# Patient Record
Sex: MALE | Race: WHITE | NOT HISPANIC OR LATINO | Employment: OTHER | URBAN - METROPOLITAN AREA
[De-identification: names, ages, dates, MRNs, and addresses within clinical notes are randomized per-mention and may not be internally consistent; named-entity substitution may affect disease eponyms.]

---

## 2017-02-09 ENCOUNTER — APPOINTMENT (INPATIENT)
Dept: RADIOLOGY | Facility: HOSPITAL | Age: 67
DRG: 301 | End: 2017-02-09
Payer: COMMERCIAL

## 2017-02-09 ENCOUNTER — APPOINTMENT (INPATIENT)
Dept: NON INVASIVE DIAGNOSTICS | Facility: HOSPITAL | Age: 67
DRG: 301 | End: 2017-02-09
Payer: COMMERCIAL

## 2017-02-09 ENCOUNTER — HOSPITAL ENCOUNTER (INPATIENT)
Facility: HOSPITAL | Age: 67
LOS: 1 days | DRG: 301 | End: 2017-02-10
Attending: EMERGENCY MEDICINE | Admitting: INTERNAL MEDICINE
Payer: COMMERCIAL

## 2017-02-09 DIAGNOSIS — I82.422 ACUTE DEEP VEIN THROMBOSIS (DVT) OF ILIAC VEIN OF LEFT LOWER EXTREMITY (HCC): ICD-10-CM

## 2017-02-09 DIAGNOSIS — M79.89 LEFT LEG SWELLING: Primary | ICD-10-CM

## 2017-02-09 DIAGNOSIS — M79.605 LEFT LEG PAIN: ICD-10-CM

## 2017-02-09 PROBLEM — L03.90 CELLULITIS: Status: ACTIVE | Noted: 2017-02-09

## 2017-02-09 LAB
ANION GAP SERPL CALCULATED.3IONS-SCNC: 13 MMOL/L (ref 4–13)
APTT PPP: 32 SECONDS (ref 24–36)
BASOPHILS # BLD AUTO: 0.02 THOUSANDS/ΜL (ref 0–0.1)
BASOPHILS NFR BLD AUTO: 0 % (ref 0–1)
BUN SERPL-MCNC: 18 MG/DL (ref 5–25)
CALCIUM SERPL-MCNC: 9.3 MG/DL (ref 8.3–10.1)
CHLORIDE SERPL-SCNC: 105 MMOL/L (ref 100–108)
CO2 SERPL-SCNC: 26 MMOL/L (ref 21–32)
CREAT SERPL-MCNC: 1.05 MG/DL (ref 0.6–1.3)
DEPRECATED D DIMER PPP: 6079 NG/ML (FEU) (ref 0–424)
EOSINOPHIL # BLD AUTO: 0.26 THOUSAND/ΜL (ref 0–0.61)
EOSINOPHIL NFR BLD AUTO: 4 % (ref 0–6)
ERYTHROCYTE [DISTWIDTH] IN BLOOD BY AUTOMATED COUNT: 13.9 % (ref 11.6–15.1)
GFR SERPL CREATININE-BSD FRML MDRD: >60 ML/MIN/1.73SQ M
GLUCOSE SERPL-MCNC: 100 MG/DL (ref 65–140)
HCT VFR BLD AUTO: 43.1 % (ref 36.5–49.3)
HGB BLD-MCNC: 14.5 G/DL (ref 12–17)
INR PPP: 0.99 (ref 0.86–1.16)
LYMPHOCYTES # BLD AUTO: 1.87 THOUSANDS/ΜL (ref 0.6–4.47)
LYMPHOCYTES NFR BLD AUTO: 26 % (ref 14–44)
MCH RBC QN AUTO: 31.5 PG (ref 26.8–34.3)
MCHC RBC AUTO-ENTMCNC: 33.6 G/DL (ref 31.4–37.4)
MCV RBC AUTO: 94 FL (ref 82–98)
MONOCYTES # BLD AUTO: 0.77 THOUSAND/ΜL (ref 0.17–1.22)
MONOCYTES NFR BLD AUTO: 11 % (ref 4–12)
NEUTROPHILS # BLD AUTO: 4.17 THOUSANDS/ΜL (ref 1.85–7.62)
NEUTS SEG NFR BLD AUTO: 59 % (ref 43–75)
NRBC BLD AUTO-RTO: 0 /100 WBCS
PLATELET # BLD AUTO: 194 THOUSANDS/UL (ref 149–390)
PMV BLD AUTO: 10.4 FL (ref 8.9–12.7)
POTASSIUM SERPL-SCNC: 4.1 MMOL/L (ref 3.5–5.3)
PROTHROMBIN TIME: 13.1 SECONDS (ref 12–14.3)
RBC # BLD AUTO: 4.61 MILLION/UL (ref 3.88–5.62)
SODIUM SERPL-SCNC: 144 MMOL/L (ref 136–145)
WBC # BLD AUTO: 7.09 THOUSAND/UL (ref 4.31–10.16)

## 2017-02-09 PROCEDURE — 85025 COMPLETE CBC W/AUTO DIFF WBC: CPT | Performed by: EMERGENCY MEDICINE

## 2017-02-09 PROCEDURE — 99285 EMERGENCY DEPT VISIT HI MDM: CPT

## 2017-02-09 PROCEDURE — 85379 FIBRIN DEGRADATION QUANT: CPT | Performed by: EMERGENCY MEDICINE

## 2017-02-09 PROCEDURE — 85610 PROTHROMBIN TIME: CPT | Performed by: INTERNAL MEDICINE

## 2017-02-09 PROCEDURE — 36415 COLL VENOUS BLD VENIPUNCTURE: CPT | Performed by: EMERGENCY MEDICINE

## 2017-02-09 PROCEDURE — 80048 BASIC METABOLIC PNL TOTAL CA: CPT | Performed by: EMERGENCY MEDICINE

## 2017-02-09 PROCEDURE — 73502 X-RAY EXAM HIP UNI 2-3 VIEWS: CPT

## 2017-02-09 PROCEDURE — 93970 EXTREMITY STUDY: CPT

## 2017-02-09 PROCEDURE — 85730 THROMBOPLASTIN TIME PARTIAL: CPT | Performed by: INTERNAL MEDICINE

## 2017-02-09 RX ORDER — ACETAMINOPHEN 325 MG/1
650 TABLET ORAL EVERY 6 HOURS PRN
Status: DISCONTINUED | OUTPATIENT
Start: 2017-02-09 | End: 2017-02-10 | Stop reason: HOSPADM

## 2017-02-09 RX ORDER — ASPIRIN 81 MG/1
81 TABLET, CHEWABLE ORAL DAILY
Status: DISCONTINUED | OUTPATIENT
Start: 2017-02-09 | End: 2017-02-10 | Stop reason: HOSPADM

## 2017-02-09 RX ORDER — ESCITALOPRAM OXALATE 10 MG/1
10 TABLET ORAL DAILY
Status: DISCONTINUED | OUTPATIENT
Start: 2017-02-09 | End: 2017-02-10 | Stop reason: HOSPADM

## 2017-02-09 RX ORDER — LORATADINE 10 MG/1
10 TABLET ORAL DAILY
Status: DISCONTINUED | OUTPATIENT
Start: 2017-02-09 | End: 2017-02-10 | Stop reason: HOSPADM

## 2017-02-09 RX ORDER — HYDRALAZINE HYDROCHLORIDE 20 MG/ML
10 INJECTION INTRAMUSCULAR; INTRAVENOUS EVERY 4 HOURS PRN
Status: DISCONTINUED | OUTPATIENT
Start: 2017-02-09 | End: 2017-02-10 | Stop reason: HOSPADM

## 2017-02-09 RX ORDER — ALPRAZOLAM 0.25 MG/1
0.25 TABLET ORAL 3 TIMES DAILY PRN
Status: DISCONTINUED | OUTPATIENT
Start: 2017-02-09 | End: 2017-02-10 | Stop reason: HOSPADM

## 2017-02-09 RX ADMIN — ACETAMINOPHEN 650 MG: 325 TABLET, FILM COATED ORAL at 21:09

## 2017-02-09 RX ADMIN — CEFAZOLIN SODIUM 1000 MG: 1 SOLUTION INTRAVENOUS at 04:54

## 2017-02-09 RX ADMIN — ALPRAZOLAM 0.25 MG: 0.25 TABLET ORAL at 09:12

## 2017-02-09 RX ADMIN — CEFAZOLIN SODIUM 2000 MG: 2 SOLUTION INTRAVENOUS at 12:32

## 2017-02-09 RX ADMIN — LORATADINE 10 MG: 10 TABLET ORAL at 17:58

## 2017-02-09 RX ADMIN — HYDRALAZINE HYDROCHLORIDE 10 MG: 20 INJECTION INTRAMUSCULAR; INTRAVENOUS at 17:03

## 2017-02-09 RX ADMIN — HYDRALAZINE HYDROCHLORIDE 10 MG: 20 INJECTION INTRAMUSCULAR; INTRAVENOUS at 05:32

## 2017-02-09 RX ADMIN — ENOXAPARIN SODIUM 80 MG: 80 INJECTION SUBCUTANEOUS at 04:54

## 2017-02-09 RX ADMIN — CEFAZOLIN SODIUM 2000 MG: 2 SOLUTION INTRAVENOUS at 21:09

## 2017-02-09 RX ADMIN — ESCITALOPRAM OXALATE 10 MG: 10 TABLET ORAL at 09:12

## 2017-02-09 RX ADMIN — ASPIRIN 81 MG 81 MG: 81 TABLET ORAL at 09:12

## 2017-02-09 RX ADMIN — ENOXAPARIN SODIUM 90 MG: 100 INJECTION SUBCUTANEOUS at 17:03

## 2017-02-10 ENCOUNTER — APPOINTMENT (INPATIENT)
Dept: RADIOLOGY | Facility: HOSPITAL | Age: 67
DRG: 272 | End: 2017-02-10
Payer: COMMERCIAL

## 2017-02-10 ENCOUNTER — APPOINTMENT (INPATIENT)
Dept: CT IMAGING | Facility: HOSPITAL | Age: 67
DRG: 301 | End: 2017-02-10
Payer: COMMERCIAL

## 2017-02-10 ENCOUNTER — HOSPITAL ENCOUNTER (INPATIENT)
Facility: HOSPITAL | Age: 67
LOS: 2 days | Discharge: HOME | DRG: 272 | End: 2017-02-12
Attending: SURGERY | Admitting: SURGERY
Payer: COMMERCIAL

## 2017-02-10 VITALS
DIASTOLIC BLOOD PRESSURE: 97 MMHG | OXYGEN SATURATION: 96 % | BODY MASS INDEX: 30.53 KG/M2 | SYSTOLIC BLOOD PRESSURE: 155 MMHG | TEMPERATURE: 98.1 F | HEIGHT: 69 IN | WEIGHT: 206.13 LBS | RESPIRATION RATE: 18 BRPM | HEART RATE: 61 BPM

## 2017-02-10 DIAGNOSIS — I82.422 ACUTE DEEP VEIN THROMBOSIS (DVT) OF ILIAC VEIN OF LEFT LOWER EXTREMITY (HCC): Primary | ICD-10-CM

## 2017-02-10 PROBLEM — IMO0001 WHITE COAT HYPERTENSION: Status: ACTIVE | Noted: 2017-02-10

## 2017-02-10 PROBLEM — L03.90 CELLULITIS: Status: RESOLVED | Noted: 2017-02-09 | Resolved: 2017-02-10

## 2017-02-10 LAB
APTT PPP: 76 SECONDS (ref 24–36)
ERYTHROCYTE [DISTWIDTH] IN BLOOD BY AUTOMATED COUNT: 13.8 % (ref 11.6–15.1)
FIBRINOGEN PPP-MCNC: 334 MG/DL (ref 227–495)
FIBRINOGEN PPP-MCNC: 359 MG/DL (ref 227–495)
HCT VFR BLD AUTO: 39 % (ref 36.5–49.3)
HGB BLD-MCNC: 13.1 G/DL (ref 12–17)
MCH RBC QN AUTO: 31.2 PG (ref 26.8–34.3)
MCHC RBC AUTO-ENTMCNC: 33.6 G/DL (ref 31.4–37.4)
MCV RBC AUTO: 93 FL (ref 82–98)
PLATELET # BLD AUTO: 205 THOUSANDS/UL (ref 149–390)
PMV BLD AUTO: 10.4 FL (ref 8.9–12.7)
RBC # BLD AUTO: 4.2 MILLION/UL (ref 3.88–5.62)
WBC # BLD AUTO: 7.32 THOUSAND/UL (ref 4.31–10.16)

## 2017-02-10 PROCEDURE — 36012 PLACE CATHETER IN VEIN: CPT

## 2017-02-10 PROCEDURE — 75825 VEIN X-RAY TRUNK: CPT

## 2017-02-10 PROCEDURE — C1894 INTRO/SHEATH, NON-LASER: HCPCS

## 2017-02-10 PROCEDURE — 04CJ3ZZ EXTIRPATION OF MATTER FROM LEFT EXTERNAL ILIAC ARTERY, PERCUTANEOUS APPROACH: ICD-10-PCS | Performed by: RADIOLOGY

## 2017-02-10 PROCEDURE — 37187 VENOUS MECH THROMBECTOMY: CPT

## 2017-02-10 PROCEDURE — 04CD3ZZ EXTIRPATION OF MATTER FROM LEFT COMMON ILIAC ARTERY, PERCUTANEOUS APPROACH: ICD-10-PCS | Performed by: RADIOLOGY

## 2017-02-10 PROCEDURE — 3E05317 INTRODUCTION OF OTHER THROMBOLYTIC INTO PERIPHERAL ARTERY, PERCUTANEOUS APPROACH: ICD-10-PCS | Performed by: RADIOLOGY

## 2017-02-10 PROCEDURE — B5191ZZ FLUOROSCOPY OF INFERIOR VENA CAVA USING LOW OSMOLAR CONTRAST: ICD-10-PCS | Performed by: RADIOLOGY

## 2017-02-10 PROCEDURE — C1769 GUIDE WIRE: HCPCS

## 2017-02-10 PROCEDURE — 85384 FIBRINOGEN ACTIVITY: CPT | Performed by: SURGERY

## 2017-02-10 PROCEDURE — C1725 CATH, TRANSLUMIN NON-LASER: HCPCS

## 2017-02-10 PROCEDURE — 047D3ZZ DILATION OF LEFT COMMON ILIAC ARTERY, PERCUTANEOUS APPROACH: ICD-10-PCS | Performed by: RADIOLOGY

## 2017-02-10 PROCEDURE — 70450 CT HEAD/BRAIN W/O DYE: CPT

## 2017-02-10 PROCEDURE — 37248 TRLUML BALO ANGIOP 1ST VEIN: CPT

## 2017-02-10 PROCEDURE — 36005 INJECTION EXT VENOGRAPHY: CPT

## 2017-02-10 PROCEDURE — 75820 VEIN X-RAY ARM/LEG: CPT

## 2017-02-10 PROCEDURE — C1751 CATH, INF, PER/CENT/MIDLINE: HCPCS

## 2017-02-10 PROCEDURE — 76937 US GUIDE VASCULAR ACCESS: CPT

## 2017-02-10 PROCEDURE — C1757 CATH, THROMBECTOMY/EMBOLECT: HCPCS

## 2017-02-10 PROCEDURE — 85730 THROMBOPLASTIN TIME PARTIAL: CPT | Performed by: RADIOLOGY

## 2017-02-10 PROCEDURE — 85384 FIBRINOGEN ACTIVITY: CPT | Performed by: RADIOLOGY

## 2017-02-10 PROCEDURE — 37212 THROMBOLYTIC VENOUS THERAPY: CPT

## 2017-02-10 PROCEDURE — 85027 COMPLETE CBC AUTOMATED: CPT | Performed by: RADIOLOGY

## 2017-02-10 RX ORDER — ALPRAZOLAM 0.25 MG/1
0.25 TABLET ORAL 3 TIMES DAILY PRN
Status: CANCELLED | OUTPATIENT
Start: 2017-02-10

## 2017-02-10 RX ORDER — ASPIRIN 81 MG/1
81 TABLET, CHEWABLE ORAL DAILY
Status: DISCONTINUED | OUTPATIENT
Start: 2017-02-11 | End: 2017-02-12 | Stop reason: HOSPADM

## 2017-02-10 RX ORDER — ACETAMINOPHEN 325 MG/1
650 TABLET ORAL EVERY 6 HOURS PRN
Status: CANCELLED | OUTPATIENT
Start: 2017-02-10

## 2017-02-10 RX ORDER — LORATADINE 10 MG/1
10 TABLET ORAL DAILY
Status: CANCELLED | OUTPATIENT
Start: 2017-02-11

## 2017-02-10 RX ORDER — HEPARIN SODIUM 1000 [USP'U]/ML
INJECTION, SOLUTION INTRAVENOUS; SUBCUTANEOUS CODE/TRAUMA/SEDATION MEDICATION
Status: COMPLETED | OUTPATIENT
Start: 2017-02-10 | End: 2017-02-10

## 2017-02-10 RX ORDER — ASPIRIN 81 MG/1
81 TABLET, CHEWABLE ORAL DAILY
Status: CANCELLED | OUTPATIENT
Start: 2017-02-11

## 2017-02-10 RX ORDER — FENTANYL CITRATE 50 UG/ML
INJECTION, SOLUTION INTRAMUSCULAR; INTRAVENOUS CODE/TRAUMA/SEDATION MEDICATION
Status: COMPLETED | OUTPATIENT
Start: 2017-02-10 | End: 2017-02-10

## 2017-02-10 RX ORDER — HYDRALAZINE HYDROCHLORIDE 20 MG/ML
5 INJECTION INTRAMUSCULAR; INTRAVENOUS EVERY 6 HOURS PRN
Status: DISCONTINUED | OUTPATIENT
Start: 2017-02-10 | End: 2017-02-12 | Stop reason: HOSPADM

## 2017-02-10 RX ORDER — ALPRAZOLAM 0.25 MG/1
0.25 TABLET ORAL 3 TIMES DAILY PRN
Status: DISCONTINUED | OUTPATIENT
Start: 2017-02-10 | End: 2017-02-12 | Stop reason: HOSPADM

## 2017-02-10 RX ORDER — HEPARIN SODIUM 10000 [USP'U]/100ML
500 INJECTION, SOLUTION INTRAVENOUS CONTINUOUS
Status: DISCONTINUED | OUTPATIENT
Start: 2017-02-10 | End: 2017-02-11

## 2017-02-10 RX ORDER — MIDAZOLAM HYDROCHLORIDE 1 MG/ML
INJECTION INTRAMUSCULAR; INTRAVENOUS CODE/TRAUMA/SEDATION MEDICATION
Status: COMPLETED | OUTPATIENT
Start: 2017-02-10 | End: 2017-02-10

## 2017-02-10 RX ORDER — ESCITALOPRAM OXALATE 10 MG/1
10 TABLET ORAL DAILY
Status: DISCONTINUED | OUTPATIENT
Start: 2017-02-11 | End: 2017-02-12 | Stop reason: HOSPADM

## 2017-02-10 RX ORDER — SODIUM CHLORIDE 9 MG/ML
84 INJECTION, SOLUTION INTRAVENOUS CONTINUOUS
Status: DISCONTINUED | OUTPATIENT
Start: 2017-02-11 | End: 2017-02-12

## 2017-02-10 RX ORDER — ESCITALOPRAM OXALATE 10 MG/1
10 TABLET ORAL DAILY
Status: CANCELLED | OUTPATIENT
Start: 2017-02-11

## 2017-02-10 RX ADMIN — ACETAMINOPHEN 650 MG: 325 TABLET, FILM COATED ORAL at 11:27

## 2017-02-10 RX ADMIN — ESCITALOPRAM OXALATE 10 MG: 10 TABLET ORAL at 09:21

## 2017-02-10 RX ADMIN — ALTEPLASE 1 MG/HR: 2.2 INJECTION, POWDER, LYOPHILIZED, FOR SOLUTION INTRAVENOUS at 20:21

## 2017-02-10 RX ADMIN — MIDAZOLAM HYDROCHLORIDE 1 MG: 1 INJECTION, SOLUTION INTRAMUSCULAR; INTRAVENOUS at 19:44

## 2017-02-10 RX ADMIN — HEPARIN SODIUM 20 UNITS/HR: 200 INJECTION, SOLUTION INTRAVENOUS at 20:21

## 2017-02-10 RX ADMIN — HEPARIN SODIUM 500 UNITS/HR: 10000 INJECTION, SOLUTION INTRAVENOUS at 21:46

## 2017-02-10 RX ADMIN — HEPARIN SODIUM 5000 UNITS: 1000 INJECTION INTRAVENOUS; SUBCUTANEOUS at 19:35

## 2017-02-10 RX ADMIN — MIDAZOLAM HYDROCHLORIDE 1 MG: 1 INJECTION, SOLUTION INTRAMUSCULAR; INTRAVENOUS at 18:09

## 2017-02-10 RX ADMIN — HYDRALAZINE HYDROCHLORIDE 5 MG: 20 INJECTION INTRAMUSCULAR; INTRAVENOUS at 21:46

## 2017-02-10 RX ADMIN — CEFAZOLIN SODIUM 2000 MG: 2 SOLUTION INTRAVENOUS at 06:15

## 2017-02-10 RX ADMIN — IOHEXOL 28 ML: 300 INJECTION, SOLUTION INTRAVENOUS at 20:37

## 2017-02-10 RX ADMIN — FENTANYL CITRATE 50 MCG: 50 INJECTION, SOLUTION INTRAMUSCULAR; INTRAVENOUS at 18:06

## 2017-02-10 RX ADMIN — ASPIRIN 81 MG 81 MG: 81 TABLET ORAL at 09:21

## 2017-02-10 RX ADMIN — MIDAZOLAM HYDROCHLORIDE 1 MG: 1 INJECTION, SOLUTION INTRAMUSCULAR; INTRAVENOUS at 18:36

## 2017-02-10 RX ADMIN — FENTANYL CITRATE 50 MCG: 50 INJECTION, SOLUTION INTRAMUSCULAR; INTRAVENOUS at 18:28

## 2017-02-10 RX ADMIN — MIDAZOLAM HYDROCHLORIDE 1 MG: 1 INJECTION, SOLUTION INTRAMUSCULAR; INTRAVENOUS at 19:10

## 2017-02-10 RX ADMIN — LORATADINE 10 MG: 10 TABLET ORAL at 09:21

## 2017-02-10 RX ADMIN — MIDAZOLAM HYDROCHLORIDE 1 MG: 1 INJECTION, SOLUTION INTRAMUSCULAR; INTRAVENOUS at 18:06

## 2017-02-10 RX ADMIN — MIDAZOLAM HYDROCHLORIDE 1 MG: 1 INJECTION, SOLUTION INTRAMUSCULAR; INTRAVENOUS at 18:16

## 2017-02-10 RX ADMIN — SODIUM CHLORIDE 84 ML/HR: 0.9 INJECTION, SOLUTION INTRAVENOUS at 23:43

## 2017-02-10 RX ADMIN — FENTANYL CITRATE 50 MCG: 50 INJECTION, SOLUTION INTRAMUSCULAR; INTRAVENOUS at 18:09

## 2017-02-10 RX ADMIN — ALPRAZOLAM 0.25 MG: 0.25 TABLET ORAL at 09:27

## 2017-02-10 RX ADMIN — ENOXAPARIN SODIUM 90 MG: 100 INJECTION SUBCUTANEOUS at 09:21

## 2017-02-10 RX ADMIN — FENTANYL CITRATE 50 MCG: 50 INJECTION, SOLUTION INTRAMUSCULAR; INTRAVENOUS at 19:15

## 2017-02-11 ENCOUNTER — APPOINTMENT (INPATIENT)
Dept: RADIOLOGY | Facility: HOSPITAL | Age: 67
DRG: 272 | End: 2017-02-11
Payer: COMMERCIAL

## 2017-02-11 LAB
ANION GAP SERPL CALCULATED.3IONS-SCNC: 11 MMOL/L (ref 4–13)
APTT PPP: 33 SECONDS (ref 24–36)
APTT PPP: 38 SECONDS (ref 24–36)
APTT PPP: 89 SECONDS (ref 24–36)
BUN SERPL-MCNC: 13 MG/DL (ref 5–25)
CALCIUM SERPL-MCNC: 8.4 MG/DL (ref 8.3–10.1)
CHLORIDE SERPL-SCNC: 108 MMOL/L (ref 100–108)
CO2 SERPL-SCNC: 24 MMOL/L (ref 21–32)
CREAT SERPL-MCNC: 0.82 MG/DL (ref 0.6–1.3)
ERYTHROCYTE [DISTWIDTH] IN BLOOD BY AUTOMATED COUNT: 13.7 % (ref 11.6–15.1)
ERYTHROCYTE [DISTWIDTH] IN BLOOD BY AUTOMATED COUNT: 13.9 % (ref 11.6–15.1)
FIBRINOGEN PPP-MCNC: 189 MG/DL (ref 227–495)
FIBRINOGEN PPP-MCNC: 321 MG/DL (ref 227–495)
GFR SERPL CREATININE-BSD FRML MDRD: >60 ML/MIN/1.73SQ M
GLUCOSE SERPL-MCNC: 104 MG/DL (ref 65–140)
HCT VFR BLD AUTO: 40.4 % (ref 36.5–49.3)
HCT VFR BLD AUTO: 40.8 % (ref 36.5–49.3)
HGB BLD-MCNC: 13.3 G/DL (ref 12–17)
HGB BLD-MCNC: 13.5 G/DL (ref 12–17)
INR PPP: 1.1 (ref 0.86–1.16)
MCH RBC QN AUTO: 30.5 PG (ref 26.8–34.3)
MCH RBC QN AUTO: 30.6 PG (ref 26.8–34.3)
MCHC RBC AUTO-ENTMCNC: 32.9 G/DL (ref 31.4–37.4)
MCHC RBC AUTO-ENTMCNC: 33.1 G/DL (ref 31.4–37.4)
MCV RBC AUTO: 93 FL (ref 82–98)
MCV RBC AUTO: 93 FL (ref 82–98)
PLATELET # BLD AUTO: 188 THOUSANDS/UL (ref 149–390)
PLATELET # BLD AUTO: 201 THOUSANDS/UL (ref 149–390)
PMV BLD AUTO: 10 FL (ref 8.9–12.7)
PMV BLD AUTO: 9.9 FL (ref 8.9–12.7)
POTASSIUM SERPL-SCNC: 3.4 MMOL/L (ref 3.5–5.3)
PROTHROMBIN TIME: 14.3 SECONDS (ref 12–14.3)
RBC # BLD AUTO: 4.36 MILLION/UL (ref 3.88–5.62)
RBC # BLD AUTO: 4.41 MILLION/UL (ref 3.88–5.62)
SODIUM SERPL-SCNC: 143 MMOL/L (ref 136–145)
WBC # BLD AUTO: 7.05 THOUSAND/UL (ref 4.31–10.16)
WBC # BLD AUTO: 8.4 THOUSAND/UL (ref 4.31–10.16)

## 2017-02-11 PROCEDURE — C1769 GUIDE WIRE: HCPCS

## 2017-02-11 PROCEDURE — 85730 THROMBOPLASTIN TIME PARTIAL: CPT | Performed by: RADIOLOGY

## 2017-02-11 PROCEDURE — C1876 STENT, NON-COA/NON-COV W/DEL: HCPCS

## 2017-02-11 PROCEDURE — 85384 FIBRINOGEN ACTIVITY: CPT | Performed by: RADIOLOGY

## 2017-02-11 PROCEDURE — C1757 CATH, THROMBECTOMY/EMBOLECT: HCPCS

## 2017-02-11 PROCEDURE — 37187 VENOUS MECH THROMBECTOMY: CPT

## 2017-02-11 PROCEDURE — C1725 CATH, TRANSLUMIN NON-LASER: HCPCS

## 2017-02-11 PROCEDURE — 80048 BASIC METABOLIC PNL TOTAL CA: CPT | Performed by: SURGERY

## 2017-02-11 PROCEDURE — 37238 OPEN/PERQ PLACE STENT SAME: CPT

## 2017-02-11 PROCEDURE — 37214 CESSJ THERAPY CATH REMOVAL: CPT

## 2017-02-11 PROCEDURE — 047J3D6: ICD-10-PCS | Performed by: RADIOLOGY

## 2017-02-11 PROCEDURE — 85027 COMPLETE CBC AUTOMATED: CPT | Performed by: RADIOLOGY

## 2017-02-11 PROCEDURE — C1894 INTRO/SHEATH, NON-LASER: HCPCS

## 2017-02-11 PROCEDURE — 85730 THROMBOPLASTIN TIME PARTIAL: CPT | Performed by: EMERGENCY MEDICINE

## 2017-02-11 PROCEDURE — 04CJ3ZZ EXTIRPATION OF MATTER FROM LEFT EXTERNAL ILIAC ARTERY, PERCUTANEOUS APPROACH: ICD-10-PCS | Performed by: RADIOLOGY

## 2017-02-11 PROCEDURE — 85610 PROTHROMBIN TIME: CPT | Performed by: EMERGENCY MEDICINE

## 2017-02-11 RX ORDER — OXYCODONE HYDROCHLORIDE 5 MG/1
5 TABLET ORAL EVERY 4 HOURS PRN
Status: DISCONTINUED | OUTPATIENT
Start: 2017-02-11 | End: 2017-02-12 | Stop reason: HOSPADM

## 2017-02-11 RX ORDER — POTASSIUM CHLORIDE 14.9 MG/ML
20 INJECTION INTRAVENOUS
Status: COMPLETED | OUTPATIENT
Start: 2017-02-11 | End: 2017-02-11

## 2017-02-11 RX ORDER — FENTANYL CITRATE 50 UG/ML
INJECTION, SOLUTION INTRAMUSCULAR; INTRAVENOUS CODE/TRAUMA/SEDATION MEDICATION
Status: COMPLETED | OUTPATIENT
Start: 2017-02-11 | End: 2017-02-11

## 2017-02-11 RX ORDER — HEPARIN SODIUM 1000 [USP'U]/ML
7200 INJECTION, SOLUTION INTRAVENOUS; SUBCUTANEOUS AS NEEDED
Status: DISCONTINUED | OUTPATIENT
Start: 2017-02-11 | End: 2017-02-11

## 2017-02-11 RX ORDER — HEPARIN SODIUM 1000 [USP'U]/ML
7200 INJECTION, SOLUTION INTRAVENOUS; SUBCUTANEOUS ONCE
Status: DISCONTINUED | OUTPATIENT
Start: 2017-02-11 | End: 2017-02-11

## 2017-02-11 RX ORDER — HEPARIN SODIUM 1000 [USP'U]/ML
3600 INJECTION, SOLUTION INTRAVENOUS; SUBCUTANEOUS AS NEEDED
Status: DISCONTINUED | OUTPATIENT
Start: 2017-02-11 | End: 2017-02-11

## 2017-02-11 RX ORDER — HEPARIN SODIUM 1000 [USP'U]/ML
3600 INJECTION, SOLUTION INTRAVENOUS; SUBCUTANEOUS AS NEEDED
Status: DISCONTINUED | OUTPATIENT
Start: 2017-02-11 | End: 2017-02-12

## 2017-02-11 RX ORDER — HEPARIN SODIUM 10000 [USP'U]/100ML
3-30 INJECTION, SOLUTION INTRAVENOUS
Status: DISCONTINUED | OUTPATIENT
Start: 2017-02-11 | End: 2017-02-12

## 2017-02-11 RX ORDER — ACETAMINOPHEN 325 MG/1
650 TABLET ORAL EVERY 6 HOURS PRN
Status: DISCONTINUED | OUTPATIENT
Start: 2017-02-11 | End: 2017-02-12 | Stop reason: HOSPADM

## 2017-02-11 RX ORDER — HEPARIN SODIUM 1000 [USP'U]/ML
INJECTION, SOLUTION INTRAVENOUS; SUBCUTANEOUS CODE/TRAUMA/SEDATION MEDICATION
Status: COMPLETED | OUTPATIENT
Start: 2017-02-11 | End: 2017-02-11

## 2017-02-11 RX ORDER — HEPARIN SODIUM 1000 [USP'U]/ML
7200 INJECTION, SOLUTION INTRAVENOUS; SUBCUTANEOUS AS NEEDED
Status: DISCONTINUED | OUTPATIENT
Start: 2017-02-11 | End: 2017-02-12

## 2017-02-11 RX ORDER — HEPARIN SODIUM 10000 [USP'U]/100ML
3-30 INJECTION, SOLUTION INTRAVENOUS
Status: DISCONTINUED | OUTPATIENT
Start: 2017-02-11 | End: 2017-02-11

## 2017-02-11 RX ADMIN — ACETAMINOPHEN 650 MG: 325 TABLET, FILM COATED ORAL at 09:37

## 2017-02-11 RX ADMIN — OXYCODONE HYDROCHLORIDE 5 MG: 5 TABLET ORAL at 22:06

## 2017-02-11 RX ADMIN — POTASSIUM CHLORIDE 20 MEQ: 200 INJECTION, SOLUTION INTRAVENOUS at 09:19

## 2017-02-11 RX ADMIN — POTASSIUM CHLORIDE 20 MEQ: 200 INJECTION, SOLUTION INTRAVENOUS at 11:03

## 2017-02-11 RX ADMIN — ALTEPLASE 1 MG/HR: 2.2 INJECTION, POWDER, LYOPHILIZED, FOR SOLUTION INTRAVENOUS at 07:21

## 2017-02-11 RX ADMIN — ASPIRIN 81 MG 81 MG: 81 TABLET ORAL at 09:19

## 2017-02-11 RX ADMIN — SODIUM CHLORIDE 84 ML/HR: 0.9 INJECTION, SOLUTION INTRAVENOUS at 21:26

## 2017-02-11 RX ADMIN — ESCITALOPRAM OXALATE 10 MG: 10 TABLET ORAL at 09:19

## 2017-02-11 RX ADMIN — SODIUM CHLORIDE 84 ML/HR: 0.9 INJECTION, SOLUTION INTRAVENOUS at 07:42

## 2017-02-11 RX ADMIN — FENTANYL CITRATE 50 MCG: 50 INJECTION, SOLUTION INTRAMUSCULAR; INTRAVENOUS at 15:58

## 2017-02-11 RX ADMIN — POTASSIUM CHLORIDE 20 MEQ: 200 INJECTION, SOLUTION INTRAVENOUS at 19:46

## 2017-02-11 RX ADMIN — ALPRAZOLAM 0.25 MG: 0.25 TABLET ORAL at 01:34

## 2017-02-11 RX ADMIN — HEPARIN SODIUM AND DEXTROSE 18 UNITS/KG/HR: 10000; 5 INJECTION INTRAVENOUS at 17:07

## 2017-02-11 RX ADMIN — HEPARIN SODIUM AND DEXTROSE 18 UNITS/KG/HR: 10000; 5 INJECTION INTRAVENOUS at 22:07

## 2017-02-11 RX ADMIN — ALPRAZOLAM 0.25 MG: 0.25 TABLET ORAL at 09:19

## 2017-02-11 RX ADMIN — IOHEXOL 28 ML: 300 INJECTION, SOLUTION INTRAVENOUS at 17:26

## 2017-02-11 RX ADMIN — HYDRALAZINE HYDROCHLORIDE 5 MG: 20 INJECTION INTRAMUSCULAR; INTRAVENOUS at 03:43

## 2017-02-11 RX ADMIN — POTASSIUM CHLORIDE 20 MEQ: 200 INJECTION, SOLUTION INTRAVENOUS at 17:26

## 2017-02-11 RX ADMIN — HEPARIN SODIUM 4000 UNITS: 1000 INJECTION INTRAVENOUS; SUBCUTANEOUS at 16:15

## 2017-02-11 RX ADMIN — OXYCODONE HYDROCHLORIDE 5 MG: 5 TABLET ORAL at 07:29

## 2017-02-11 RX ADMIN — ACETAMINOPHEN 650 MG: 325 TABLET, FILM COATED ORAL at 17:26

## 2017-02-12 VITALS
DIASTOLIC BLOOD PRESSURE: 83 MMHG | HEART RATE: 68 BPM | OXYGEN SATURATION: 98 % | WEIGHT: 201.5 LBS | SYSTOLIC BLOOD PRESSURE: 142 MMHG | RESPIRATION RATE: 30 BRPM | BODY MASS INDEX: 29.84 KG/M2 | HEIGHT: 69 IN | TEMPERATURE: 98.3 F

## 2017-02-12 LAB
ANION GAP SERPL CALCULATED.3IONS-SCNC: 9 MMOL/L (ref 4–13)
APTT PPP: 71 SECONDS (ref 24–36)
APTT PPP: 97 SECONDS (ref 24–36)
BUN SERPL-MCNC: 11 MG/DL (ref 5–25)
CALCIUM SERPL-MCNC: 7.9 MG/DL (ref 8.3–10.1)
CHLORIDE SERPL-SCNC: 109 MMOL/L (ref 100–108)
CO2 SERPL-SCNC: 24 MMOL/L (ref 21–32)
CREAT SERPL-MCNC: 0.72 MG/DL (ref 0.6–1.3)
ERYTHROCYTE [DISTWIDTH] IN BLOOD BY AUTOMATED COUNT: 14 % (ref 11.6–15.1)
GFR SERPL CREATININE-BSD FRML MDRD: >60 ML/MIN/1.73SQ M
GLUCOSE SERPL-MCNC: 100 MG/DL (ref 65–140)
HCT VFR BLD AUTO: 35.4 % (ref 36.5–49.3)
HGB BLD-MCNC: 11.7 G/DL (ref 12–17)
MCH RBC QN AUTO: 30.6 PG (ref 26.8–34.3)
MCHC RBC AUTO-ENTMCNC: 33.1 G/DL (ref 31.4–37.4)
MCV RBC AUTO: 93 FL (ref 82–98)
PLATELET # BLD AUTO: 172 THOUSANDS/UL (ref 149–390)
PMV BLD AUTO: 10.4 FL (ref 8.9–12.7)
POTASSIUM SERPL-SCNC: 3.6 MMOL/L (ref 3.5–5.3)
RBC # BLD AUTO: 3.82 MILLION/UL (ref 3.88–5.62)
SODIUM SERPL-SCNC: 142 MMOL/L (ref 136–145)
WBC # BLD AUTO: 6.84 THOUSAND/UL (ref 4.31–10.16)

## 2017-02-12 PROCEDURE — 80048 BASIC METABOLIC PNL TOTAL CA: CPT | Performed by: EMERGENCY MEDICINE

## 2017-02-12 PROCEDURE — 85027 COMPLETE CBC AUTOMATED: CPT | Performed by: EMERGENCY MEDICINE

## 2017-02-12 PROCEDURE — 85730 THROMBOPLASTIN TIME PARTIAL: CPT | Performed by: SURGERY

## 2017-02-12 PROCEDURE — 85730 THROMBOPLASTIN TIME PARTIAL: CPT | Performed by: EMERGENCY MEDICINE

## 2017-02-12 RX ORDER — OXYCODONE HYDROCHLORIDE 5 MG/1
5 TABLET ORAL EVERY 4 HOURS PRN
Qty: 30 TABLET | Refills: 0
Start: 2017-02-12 | End: 2018-01-30 | Stop reason: ALTCHOICE

## 2017-02-12 RX ADMIN — ASPIRIN 81 MG 81 MG: 81 TABLET ORAL at 10:12

## 2017-02-12 RX ADMIN — OXYCODONE HYDROCHLORIDE 5 MG: 5 TABLET ORAL at 15:22

## 2017-02-12 RX ADMIN — RIVAROXABAN 15 MG: 15 TABLET, FILM COATED ORAL at 14:57

## 2017-02-12 RX ADMIN — OXYCODONE HYDROCHLORIDE 5 MG: 5 TABLET ORAL at 10:13

## 2017-02-12 RX ADMIN — ESCITALOPRAM OXALATE 10 MG: 10 TABLET ORAL at 10:12

## 2017-02-14 ENCOUNTER — GENERIC CONVERSION - ENCOUNTER (OUTPATIENT)
Dept: OTHER | Facility: OTHER | Age: 67
End: 2017-02-14

## 2017-02-16 ENCOUNTER — TRANSCRIBE ORDERS (OUTPATIENT)
Dept: ADMINISTRATIVE | Facility: HOSPITAL | Age: 67
End: 2017-02-16

## 2017-02-16 ENCOUNTER — ALLSCRIPTS OFFICE VISIT (OUTPATIENT)
Dept: OTHER | Facility: OTHER | Age: 67
End: 2017-02-16

## 2017-02-16 DIAGNOSIS — I82.422: Primary | ICD-10-CM

## 2017-02-17 ENCOUNTER — GENERIC CONVERSION - ENCOUNTER (OUTPATIENT)
Dept: OTHER | Facility: OTHER | Age: 67
End: 2017-02-17

## 2017-02-22 ENCOUNTER — ALLSCRIPTS OFFICE VISIT (OUTPATIENT)
Dept: OTHER | Facility: OTHER | Age: 67
End: 2017-02-22

## 2017-04-05 ENCOUNTER — ALLSCRIPTS OFFICE VISIT (OUTPATIENT)
Dept: OTHER | Facility: OTHER | Age: 67
End: 2017-04-05

## 2017-04-26 ENCOUNTER — GENERIC CONVERSION - ENCOUNTER (OUTPATIENT)
Dept: OTHER | Facility: OTHER | Age: 67
End: 2017-04-26

## 2017-05-01 ENCOUNTER — GENERIC CONVERSION - ENCOUNTER (OUTPATIENT)
Dept: OTHER | Facility: OTHER | Age: 67
End: 2017-05-01

## 2017-05-08 DIAGNOSIS — R93.5 ABNORMAL FINDINGS ON DIAGNOSTIC IMAGING OF OTHER ABDOMINAL REGIONS, INCLUDING RETROPERITONEUM: ICD-10-CM

## 2017-05-09 ENCOUNTER — HOSPITAL ENCOUNTER (OUTPATIENT)
Dept: CT IMAGING | Facility: HOSPITAL | Age: 67
Discharge: HOME | End: 2017-05-09
Attending: SURGERY
Payer: COMMERCIAL

## 2017-05-09 DIAGNOSIS — E27.9 DISORDER OF ADRENAL GLAND (HCC): ICD-10-CM

## 2017-05-09 PROCEDURE — 74177 CT ABD & PELVIS W/CONTRAST: CPT

## 2017-05-09 RX ADMIN — IOHEXOL 100 ML: 350 INJECTION, SOLUTION INTRAVENOUS at 09:01

## 2017-05-12 ENCOUNTER — GENERIC CONVERSION - ENCOUNTER (OUTPATIENT)
Dept: OTHER | Facility: OTHER | Age: 67
End: 2017-05-12

## 2017-07-16 DIAGNOSIS — I82.422 EMBOLISM AND THROMBOSIS OF LEFT ILIAC VEIN (HCC): ICD-10-CM

## 2017-07-17 ENCOUNTER — HOSPITAL ENCOUNTER (OUTPATIENT)
Dept: NON INVASIVE DIAGNOSTICS | Facility: CLINIC | Age: 67
Discharge: HOME | End: 2017-07-17
Payer: COMMERCIAL

## 2017-07-17 DIAGNOSIS — I82.422 EMBOLISM AND THROMBOSIS OF LEFT ILIAC VEIN (HCC): ICD-10-CM

## 2017-07-17 PROCEDURE — 93978 VASCULAR STUDY: CPT

## 2017-07-17 PROCEDURE — 93970 EXTREMITY STUDY: CPT

## 2017-07-18 ENCOUNTER — ALLSCRIPTS OFFICE VISIT (OUTPATIENT)
Dept: OTHER | Facility: OTHER | Age: 67
End: 2017-07-18

## 2017-07-19 ENCOUNTER — ALLSCRIPTS OFFICE VISIT (OUTPATIENT)
Dept: OTHER | Facility: OTHER | Age: 67
End: 2017-07-19

## 2017-07-24 ENCOUNTER — GENERIC CONVERSION - ENCOUNTER (OUTPATIENT)
Dept: OTHER | Facility: OTHER | Age: 67
End: 2017-07-24

## 2017-08-15 ENCOUNTER — ALLSCRIPTS OFFICE VISIT (OUTPATIENT)
Dept: OTHER | Facility: OTHER | Age: 67
End: 2017-08-15

## 2017-08-17 ENCOUNTER — GENERIC CONVERSION - ENCOUNTER (OUTPATIENT)
Dept: OTHER | Facility: OTHER | Age: 67
End: 2017-08-17

## 2018-01-09 NOTE — MISCELLANEOUS
Message  wife called asking about prophylactic antibiotics for dental work, we recommended (along with arterial stent protocol) 2 gms Keflex one hour prior to dental work, since his stent was so recent  i called wife and told her to tell the dentist to order  Active Problems    1  Abnormal CT of the abdomen (793 6) (R93 5)   2  Acute deep vein thrombosis (DVT) of iliac vein of left lower extremity (453 41) (I82 422)   3  Anxiety (300 00) (F41 9)   4  HTN, white coat (796 2) (R03 0)   5  Hypercholesteremia (272 0) (E78 00)   6  Need for prophylactic vaccination and inoculation against influenza (V04 81) (Z23)   7  Osteoarthritis (715 90) (M19 90)    Current Meds   1  ALPRAZolam 0 25 MG Oral Tablet; TAKE 1 TABLET 3 TIMES DAILY AS NEEDED; Therapy: 01Sep2015 to (Evaluate:79Hbp2211); Last Rx:16Jan2017 Ordered   2  Aspirin 81 MG TABS; TAKE 1 TABLET DAILY; Therapy: (Recorded:95Xiu3837) to Recorded   3  Daily Multivitamin TABS; Therapy: (Recorded:09Feb2015) to Recorded   4  Escitalopram Oxalate 10 MG Oral Tablet; TAKE 1/2  TABLET BY MOUTH EVERY DAY; Therapy: 03Sep2015 to (Evaluate:91Cxh1101)  Requested for: 61ZWA0513; Last   Rx:92The8982 Ordered   5  Glucosamine-Chondroitin DS Oral Tablet; TAKE 1 TABLET DAILY AS DIRECTED; Therapy: (Recorded:72Kyv7497) to Recorded   6  Magnesium 250 MG Oral Tablet; TAKE 1 TABLET DAILY; Therapy: (Recorded:11Piq5048) to Recorded   7  Omega-3 Krill Oil 300 MG Oral Capsule; 1 DAILY; Therapy: (Recorded:06Tpu3011) to Recorded   8  Oxycodone-Acetaminophen 5-325 MG Oral Tablet; TAKE 1 TABLET EVERY 4 TO 6   HOURS AS NEEDED FOR PAIN;   Therapy: 55SCF3402 to (Evaluate:31Onl1331); Last Rx:44Cfs1155 Ordered   9  Saw Palmetto 450 MG Oral Capsule; 2 tabs daily; Therapy: (Recorded:66Ymx6634) to Recorded   10  Vitamin D3 2000 UNIT Oral Capsule; take 1 capsule once daily; Therapy: (Recorded:07Xxy5140) to Recorded   11  Xarelto 20 MG Oral Tablet; Take 1 tablet daily;  Last Rx:83Cfo5894; Status: NEED    INFORMATION - Problem Ordered   12  Zyrtec 10 MG TABS; Take 1 tablet daily; Therapy: (Recorded:14Vzg0829) to Recorded    Allergies    1  No Known Drug Allergies    2  No Known Environmental Allergies   3  No Known Food Allergies    Signatures   Electronically signed by :  Haresh Kincaid, ; Feb 17 2017  9:25AM EST                       (Author)

## 2018-01-10 NOTE — MISCELLANEOUS
Message   Recorded as Task   Date: 05/12/2017 12:21 PM, Created By: Goran Arellano   Task Name: Go to Result   Assigned To: [facility],Team   Regarding Patient: Nanci Ruiz, Status: Active   CommentArmida Covert - 12 May 2017 12:21 PM     TASK CREATED  call patient with benign results   no abnormal adreanal glands  Leanne Perez - 12 May 2017 12:52 PM     TASK EDITED  Called patient and results of CT abd/pelvis were given  Patient was very happy with the results  Active Problems    1  Abnormal CT of the abdomen (793 6) (R93 5)   2  Acute deep vein thrombosis (DVT) of iliac vein of left lower extremity (453 41) (I82 422)   3  Anxiety (300 00) (F41 9)   4  Elevated BP without diagnosis of hypertension (796 2) (R03 0)   5  Hypercholesteremia (272 0) (E78 00)   6  Need for pneumococcal vaccination (V03 82) (Z23)   7  Need for prophylactic vaccination and inoculation against influenza (V04 81) (Z23)   8  Osteoarthritis (715 90) (M19 90)   9  Screening for genitourinary condition (V81 6) (Z13 89)   10  Skin rash (782 1) (R21)    Current Meds   1  ALPRAZolam 0 25 MG Oral Tablet; TAKE 1 TABLET 3 TIMES DAILY AS NEEDED; Therapy: 01Sep2015 to (Evaluate:34Tjp6874); Last Rx:05Apr2017 Ordered   2  Daily Multivitamin TABS; Therapy: (Recorded:08Pyv3892) to Recorded   3  Escitalopram Oxalate 10 MG Oral Tablet; TAKE 1/2  TABLET BY MOUTH EVERY DAY; Therapy: 03Sep2015 to (Evaluate:14Ixl1376)  Requested for: 21QIC4650; Last   Rx:15Jxf2832 Ordered   4  Glucosamine-Chondroitin DS Oral Tablet; TAKE 1 TABLET DAILY AS DIRECTED; Therapy: (Recorded:03Sep2015) to Recorded   5  Magnesium 250 MG Oral Tablet; TAKE 1 TABLET DAILY; Therapy: (Recorded:09Feb2015) to Recorded   6  Mometasone Furoate 0 1 % External Cream; apply daily; Therapy: 69DTP4414 to (Eveline Car)  Requested for: 05Apr2017; Last   Rx:05Apr2017 Ordered   7  Omega-3 Krill Oil 300 MG Oral Capsule; 1 DAILY;    Therapy: (Recorded:54Nle0811) to Recorded   8  Saw Palmetto 450 MG Oral Capsule; 2 tabs daily; Therapy: (Recorded:09Feb2015) to Recorded   9  Vitamin D3 2000 UNIT Oral Capsule; take 1 capsule once daily; Therapy: (Recorded:09Feb2015) to Recorded   10  Xarelto 20 MG Oral Tablet; One tablet daily  Requested for: 05Apr2017; Last    Rx:05Apr2017 Ordered   11  Zyrtec 10 MG TABS; Take 1 tablet daily; Therapy: (Recorded:09Feb2015) to Recorded    Allergies    1  No Known Drug Allergies    2  No Known Environmental Allergies   3   No Known Food Allergies    Signatures   Electronically signed by : Joni Ball, ; May 12 2017 12:52PM EST                       (Author)

## 2018-01-11 NOTE — MISCELLANEOUS
Message   Recorded as Task   Date: 10/17/2016 04:29 PM, Created By: Walter Newton   Task Name: Go to Result   Assigned To: [facility],Team   Regarding Patient: Kylah Briggs, Status: Active   CommentArley Ball - 17 Oct 2016 4:29 PM     TASK CREATED  Call patient  No adrenal abnormality  Small liver lesion for which six-month CT scan was recommended  This is probably benign but needs follow-up  Patient can come to the office to discuss further  [facility] - 17 Oct 2016 4:39 PM     TASK EDITED  Called patient and spoke with his wife Mikey Wallace  Informed her that there was no adrenal abnormality  There was a small liver lesion and a 6 month follow-up CT is recommended  Most likely it is benign but it needs a follow-up to monitor it  Offered to schedule the patient for an appointment to discuss the results further  Mikey Wallace states she will talk with her  and call in the morning to schedule an appointment  [facility] - 18 Oct 2016 2:26 PM     TASK EDITED  Patient called office and scheduled an appointment with Quan Ortega for 10/20/16 at the [facility] to discuss CT results  Active Problems    1  Adrenal mass (255 9) (E27 9)   2  Anxiety (300 00) (F41 9)   3  HTN, white coat (796 2) (R03 0)   4  Hypercholesteremia (272 0) (E78 00)   5  Lipid screening (V77 91) (Z13 220)   6  Osteoarthritis (715 90) (M19 90)    Current Meds   1  ALPRAZolam 0 25 MG Oral Tablet; TAKE 1 TABLET 3 TIMES DAILY AS NEEDED; Therapy: 01Sep2015 to (Evaluate:02Oct2016); Last Rx:57Xqx2053 Ordered   2  Aspirin 81 MG TABS; TAKE 1 TABLET DAILY; Therapy: (Recorded:89Aph2695) to Recorded   3  Daily Multivitamin TABS; Therapy: (Recorded:62Gni4065) to Recorded   4  Escitalopram Oxalate 10 MG Oral Tablet; take 1 tablet by mouth every day; Therapy: 03Sep2015 to (Evaluate:01Kri3868)  Requested for: 71KOP0488; Last   Rx:22Jun2016 Ordered   5   Glucosamine-Chondroitin DS Oral Tablet; TAKE 1 TABLET DAILY AS DIRECTED; Therapy: (Recorded:03Sep2015) to Recorded   6  Magnesium 250 MG Oral Tablet; TAKE 1 TABLET DAILY; Therapy: (Recorded:09Feb2015) to Recorded   7  Omega-3 Krill Oil 300 MG Oral Capsule; 1 DAILY; Therapy: (Recorded:03Sep2015) to Recorded   8  Saw Palmetto 450 MG Oral Capsule; 2 tabs daily; Therapy: (Recorded:09Feb2015) to Recorded   9  Vitamin D3 2000 UNIT Oral Capsule; take 1 capsule once daily; Therapy: (Recorded:09Feb2015) to Recorded   10  Zyrtec 10 MG TABS; Take 1 tablet daily; Therapy: (Recorded:09Feb2015) to Recorded    Allergies    1   No Known Drug Allergies    Signatures   Electronically signed by : Daphnie Britton, ; Oct 18 2016  2:27PM EST                       (Author)

## 2018-01-12 NOTE — MISCELLANEOUS
Message  Pt called back  He went to the ophthalmologist  they gave him eye drops  He said he has a teat in his retina from an unknown cause  He is going to [facility] on Monday  Active Problems    1  Abnormal CT of the abdomen (793 6) (R93 5)   2  Acute deep vein thrombosis (DVT) of iliac vein of left lower extremity (453 41) (I82 422)   3  Anxiety (300 00) (F41 9)   4  Elevated BP without diagnosis of hypertension (796 2) (R03 0)   5  Hypercholesteremia (272 0) (E78 00)   6  Need for pneumococcal vaccination (V03 82) (Z23)   7  Need for prophylactic vaccination and inoculation against influenza (V04 81) (Z23)   8  Osteoarthritis (715 90) (M19 90)   9  Screening for genitourinary condition (V81 6) (Z13 89)   10  Skin rash (782 1) (R21)    Current Meds   1  ALPRAZolam 0 25 MG Oral Tablet; TAKE 1 TABLET 3 TIMES DAILY AS NEEDED; Therapy: 01Sep2015 to (Evaluate:09May2017); Last Rx:05Apr2017 Ordered   2  Daily Multivitamin TABS; Therapy: (Recorded:09Feb2015) to Recorded   3  Escitalopram Oxalate 10 MG Oral Tablet; TAKE 1/2  TABLET BY MOUTH EVERY DAY; Therapy: 03Sep2015 to (Evaluate:30May2017)  Requested for: 05FWL2453; Last   Rx:07Buf3242 Ordered   4  Glucosamine-Chondroitin DS Oral Tablet; TAKE 1 TABLET DAILY AS DIRECTED; Therapy: (Recorded:03Sep2015) to Recorded   5  Magnesium 250 MG Oral Tablet; TAKE 1 TABLET DAILY; Therapy: (Recorded:09Feb2015) to Recorded   6  Mometasone Furoate 0 1 % External Cream; apply daily; Therapy: 87CWM1326 to (Barry Winter)  Requested for: 05Apr2017; Last   Rx:05Apr2017 Ordered   7  Omega-3 Krill Oil 300 MG Oral Capsule; 1 DAILY; Therapy: (Recorded:03Sep2015) to Recorded   8  Saw Palmetto 450 MG Oral Capsule; 2 tabs daily; Therapy: (Recorded:09Feb2015) to Recorded   9  Vitamin D3 2000 UNIT Oral Capsule; take 1 capsule once daily; Therapy: (Recorded:09Feb2015) to Recorded   10  Xarelto 20 MG Oral Tablet;  One tablet daily  Requested for: 05Apr2017; Last Rx:03Vme7356 Ordered   11  Zyrtec 10 MG TABS; Take 1 tablet daily; Therapy: (Recorded:02Aol6452) to Recorded    Allergies    1  No Known Drug Allergies    2  No Known Environmental Allergies   3   No Known Food Allergies    Signatures   Electronically signed by : Jenniffer Crawford, ; Apr 26 2017  3:02PM EST                       (Author)

## 2018-01-13 VITALS
BODY MASS INDEX: 31.11 KG/M2 | SYSTOLIC BLOOD PRESSURE: 160 MMHG | HEIGHT: 67 IN | RESPIRATION RATE: 18 BRPM | HEART RATE: 86 BPM | WEIGHT: 198.25 LBS | DIASTOLIC BLOOD PRESSURE: 84 MMHG

## 2018-01-13 VITALS
WEIGHT: 198.13 LBS | SYSTOLIC BLOOD PRESSURE: 132 MMHG | OXYGEN SATURATION: 95 % | RESPIRATION RATE: 14 BRPM | DIASTOLIC BLOOD PRESSURE: 72 MMHG | BODY MASS INDEX: 30.03 KG/M2 | TEMPERATURE: 96.7 F | HEART RATE: 63 BPM | HEIGHT: 68 IN

## 2018-01-13 VITALS
HEIGHT: 67 IN | WEIGHT: 198.5 LBS | TEMPERATURE: 97.8 F | RESPIRATION RATE: 15 BRPM | HEART RATE: 68 BPM | DIASTOLIC BLOOD PRESSURE: 90 MMHG | BODY MASS INDEX: 31.16 KG/M2 | SYSTOLIC BLOOD PRESSURE: 174 MMHG

## 2018-01-13 VITALS
TEMPERATURE: 98.7 F | WEIGHT: 196 LBS | BODY MASS INDEX: 30.76 KG/M2 | RESPIRATION RATE: 15 BRPM | DIASTOLIC BLOOD PRESSURE: 88 MMHG | HEIGHT: 67 IN | HEART RATE: 72 BPM | SYSTOLIC BLOOD PRESSURE: 162 MMHG

## 2018-01-14 VITALS
SYSTOLIC BLOOD PRESSURE: 164 MMHG | HEIGHT: 67 IN | WEIGHT: 203.5 LBS | RESPIRATION RATE: 14 BRPM | DIASTOLIC BLOOD PRESSURE: 96 MMHG | HEART RATE: 64 BPM | BODY MASS INDEX: 31.94 KG/M2

## 2018-01-14 VITALS
BODY MASS INDEX: 31.11 KG/M2 | TEMPERATURE: 97.5 F | RESPIRATION RATE: 16 BRPM | HEART RATE: 72 BPM | DIASTOLIC BLOOD PRESSURE: 90 MMHG | WEIGHT: 198.25 LBS | HEIGHT: 67 IN | SYSTOLIC BLOOD PRESSURE: 162 MMHG

## 2018-01-15 NOTE — MISCELLANEOUS
Message   Recorded as Task   Date: 08/29/2016 03:02 PM, Created By: System   Task Name: Schedule Appointment   Assigned To: Susanne Martinez   Regarding Patient: Socorro Loyd, Status: In Progress   Comment:    System - 29 Aug 2016 3:02 PM     Preferred Communication: Mail  Ordering Site: [facility]    Referred To: Clair Jimenez MD, [facility]  To Be Done: 29 Aug 2016   [facility] - 29 Aug 2016 3:21 PM     TASK IN PROGRESS   Leodis Goodell - 30 Aug 2016 9:58 AM     TASK EDITED  Pt will call to schedule directly with office due to insurance concerns and pt also wants to try to get in before October for surgery  Active Problems    1  Anxiety (300 00) (F41 9)   2  HTN, white coat (796 2) (R03 0)   3  Hypercholesteremia (272 0) (E78 0)   4  Lipid screening (V77 91) (Z13 220)   5  Need for prophylactic vaccination and inoculation against influenza (V04 81) (Z23)   6  Osteoarthritis (715 90) (M19 90)   7  Unilateral inguinal hernia without obstruction or gangrene, recurrence not specified   (550 90) (K40 90)    Current Meds   1  ALPRAZolam 0 25 MG Oral Tablet; TAKE 1 TABLET 3 TIMES DAILY AS NEEDED; Therapy: 01Sep2015 to (Evaluate:02Oct2016); Last Rx:29Aug2016 Ordered   2  Aspirin 81 MG TABS; TAKE 1 TABLET DAILY; Therapy: (Recorded:09Nob4101) to Recorded   3  Daily Multivitamin TABS; Therapy: (Recorded:09Feb2015) to Recorded   4  Escitalopram Oxalate 10 MG Oral Tablet; take 1 tablet by mouth every day; Therapy: 03Sep2015 to (Evaluate:13Yap9405)  Requested for: 26PAW4722; Last   Rx:22Jun2016 Ordered   5  Glucosamine-Chondroitin DS Oral Tablet; TAKE 1 TABLET DAILY AS DIRECTED; Therapy: (Recorded:03Sep2015) to Recorded   6  Magnesium 250 MG Oral Tablet; TAKE 1 TABLET DAILY; Therapy: (Recorded:91Fgk0678) to Recorded   7  Omega-3 Krill Oil 300 MG Oral Capsule; 1 DAILY; Therapy: (Recorded:03Sep2015) to Recorded   8  Saw Palmetto 450 MG Oral Capsule; 2 tabs daily;    Therapy: (Nanette Hanna) to Recorded   9  Vitamin D3 2000 UNIT Oral Capsule; take 1 capsule once daily; Therapy: (Recorded:10Uty5798) to Recorded   10  Zyrtec 10 MG TABS; Take 1 tablet daily; Therapy: (Recorded:09Feb2015) to Recorded    Allergies    1   No Known Drug Allergies    Signatures   Electronically signed by : ADORE Palmer ; Sep  1 2016 10:59AM EST                       (Author)

## 2018-01-15 NOTE — MISCELLANEOUS
Message   Recorded as Task   Date: 08/17/2017 02:34 PM, Created By: Pernell Del Castillo   Task Name: Call Back   Assigned To: Karrie Shetty   Regarding Patient: Mikal Sprague, Status: Active   Comment:    RigoMnauelito gillisLizet - 17 Aug 2017 2:34 PM     TASK CREATED  PT SAW DR AL ON TUESDAY AND FORGOT TO ASK HIM A QUESTION   PT WANTS TO KNOW IF EVEN IF HE IS ON A BLOOD THINNER DOES HE THINK IT WOULD BE OK TO TAKE APPLE CIDER VINAGER BECAUSE HE HEARD FROM A FRIEND THAT IT IS GOOD FOR BLOOD CLOTS  PLEASE ASK DR AL AND CALL HIM WITH THE ANSWER  [phone]   Returned call to patient - Explained that it is not harmful for him to take Apple Cider vinegar with Xarelto  Reviewed that there is no data supporting this as "blood thinner" treatment      Active Problems    1  Abnormal CT of the abdomen (793 6) (R93 5)   2  Acute deep vein thrombosis (DVT) of iliac vein of left lower extremity (453 41) (I82 422)   3  Allergic rhinitis (477 9) (J30 9)   4  Anxiety (300 00) (F41 9)   5  Elevated BP without diagnosis of hypertension (796 2) (R03 0)   6  Hypercholesteremia (272 0) (E78 00)   7  Need for pneumococcal vaccination (V03 82) (Z23)   8  Need for prophylactic vaccination and inoculation against influenza (V04 81) (Z23)   9  Osteoarthritis (715 90) (M19 90)   10  Screening for genitourinary condition (V81 6) (Z13 89)   11  Skin rash (782 1) (R21)    Current Meds   1  ALPRAZolam 0 25 MG Oral Tablet; TAKE 1/2  TABLET 3 TIMES DAILY AS NEEDED; Therapy: 09Btn3216 to (Evaluate:68Kwv7960); Last Rx:59Vib7857 Ordered   2  Daily Multivitamin TABS; Therapy: (Recorded:64Uct3191) to Recorded   3  Escitalopram Oxalate 10 MG Oral Tablet; TAKE 1/2  TABLET BY MOUTH EVERY DAY; Therapy: 73Lgp1627 to (Evaluate:22Iuw6768)  Requested for: 90EET9721; Last   Rx:57Itx2338 Ordered   4  Glucosamine-Chondroitin DS Oral Tablet; TAKE 1 TABLET DAILY AS DIRECTED; Therapy: (Recorded:82Qwi1481) to Recorded   5   Magnesium 250 MG Oral Tablet; TAKE 1 TABLET DAILY; Therapy: (Recorded:86Rqm1841) to Recorded   6  Mometasone Furoate 0 1 % External Cream; PRN; Therapy: (Recorded:41Sws2560) to Recorded   7  Omega-3 Krill Oil 300 MG Oral Capsule; 1 DAILY; Therapy: (Recorded:32Vbm7066) to Recorded   8  Saw Palmetto 450 MG Oral Capsule; 2 tabs daily; Therapy: (Recorded:71Xpz7257) to Recorded   9  Vitamin D3 2000 UNIT Oral Capsule; take 1 capsule once daily; Therapy: (Recorded:28Cro1765) to Recorded   10  Xarelto 20 MG Oral Tablet; One tablet daily  Requested for: 05Apr2017; Last    Rx:05Apr2017 Ordered   11  Zyrtec 10 MG TABS; Take 1 tablet daily; Therapy: (Recorded:88Sqn9003) to Recorded    Allergies    1  No Known Drug Allergies    2  No Known Environmental Allergies   3   No Known Food Allergies    Signatures   Electronically signed by : Jean Gonzalez, ; Aug 17 2017  3:31PM EST                       (Author)

## 2018-01-16 NOTE — MISCELLANEOUS
Message   Recorded as Task   Date: 09/29/2016 10:33 AM, Created By: Scott Ley   Task Name: Follow Up   Assigned To: [facility],Team   Regarding Patient: Nanci Ruiz, Status: Active   CommentPervis Jose Flow - 29 Sep 2016 10:33 AM     TASK CREATED    Routine post op call placed to patient  Right inguinal hernia repair with mesh insertion done on 9/28/16  Patient answered and states he is doing well  Has a little bit of pain, managable with pain medication and ice  Denies and fever,chills, nausea, and vomiting  Has not moved his bowels yet but is passing gas  Appetite is okay  Did not remove bandage yet  Verified post op appointment for 10/11/16 at 11:00am at the [facility] office  Told patient to call the office if he has any questions or concerns  Patient verbalized understanding and thanked me for the call  Path pending  Scott Ley - 07 Oct 2016 2:58 PM     TASK EDITED  Called patient with results and informed him that it was benign and there was no evidence of cancer  It was consistent with hernia sac  [facility]  Patient verbalized understanding and had no questions or concerns  Active Problems    1  Anxiety (300 00) (F41 9)   2  Hernia, inguinal, right (550 90) (K40 90)   3  HTN, white coat (796 2) (R03 0)   4  Hypercholesteremia (272 0) (E78 00)   5  Lipid screening (V77 91) (Z13 220)   6  Need for prophylactic vaccination and inoculation against influenza (V04 81) (Z23)   7  Osteoarthritis (715 90) (M19 90)    Current Meds   1  ALPRAZolam 0 25 MG Oral Tablet; TAKE 1 TABLET 3 TIMES DAILY AS NEEDED; Therapy: 23Pfq6447 to (Evaluate:02Oct2016); Last Rx:47Rke9344 Ordered   2  Aspirin 81 MG TABS; TAKE 1 TABLET DAILY; Therapy: (Recorded:09Feb2015) to Recorded   3  Daily Multivitamin TABS; Therapy: (Recorded:09Feb2015) to Recorded   4   Escitalopram Oxalate 10 MG Oral Tablet; take 1 tablet by mouth every day; Therapy: 03Sep2015 to (Evaluate:80Ebp2603)  Requested for: 37SWW9725; Last   Rx:22Jun2016 Ordered   5  Glucosamine-Chondroitin DS Oral Tablet; TAKE 1 TABLET DAILY AS DIRECTED; Therapy: (Recorded:03Sep2015) to Recorded   6  Magnesium 250 MG Oral Tablet; TAKE 1 TABLET DAILY; Therapy: (Recorded:09Feb2015) to Recorded   7  Omega-3 Krill Oil 300 MG Oral Capsule; 1 DAILY; Therapy: (Recorded:03Sep2015) to Recorded   8  Saw Palmetto 450 MG Oral Capsule; 2 tabs daily; Therapy: (Recorded:09Feb2015) to Recorded   9  Vitamin D3 2000 UNIT Oral Capsule; take 1 capsule once daily; Therapy: (Recorded:09Feb2015) to Recorded   10  Zyrtec 10 MG TABS; Take 1 tablet daily; Therapy: (Recorded:09Feb2015) to Recorded    Allergies    1   No Known Drug Allergies    Signatures   Electronically signed by : Rebeka De La Vega, ; Oct  7 2016  2:58PM EST                       (Author)

## 2018-01-17 NOTE — MISCELLANEOUS
Message  74year old gentlemen that has been on Xarelto 20 since March for an extensive LLE DVT requiring Lysis/ pta/stenting  He said for the past week he has been experiencing blurred vision in both eye constantly and intermittent floaters in both eyes but worse on the left  States he mainly notices the floaters in well light areas  He denies slurred speech, facial weakness/ drooping  Denies weakness to extremities  No hx of tia/cva  No cv duplex in chart  Not seeing visual disturbances as a direct side effect  s/w Dr Kaitlynn Lowery and it was recommended he see the pcp or eye dr  He is seeing the eye dr today  pt also called and ask for antibx for teeth cleaning because he was told that he needed it  I s/w Dr Kaitlynn Lowery and she said it is not needed  Only patients with prosthetic graft material need ppx   He has a bare metal stent so it is NOT needed  Pt and dentis aware       Active Problems    1  Abnormal CT of the abdomen (793 6) (R93 5)   2  Acute deep vein thrombosis (DVT) of iliac vein of left lower extremity (453 41) (I82 422)   3  Anxiety (300 00) (F41 9)   4  Elevated BP without diagnosis of hypertension (796 2) (R03 0)   5  Hypercholesteremia (272 0) (E78 00)   6  Need for pneumococcal vaccination (V03 82) (Z23)   7  Need for prophylactic vaccination and inoculation against influenza (V04 81) (Z23)   8  Osteoarthritis (715 90) (M19 90)   9  Screening for genitourinary condition (V81 6) (Z13 89)   10  Skin rash (782 1) (R21)    Current Meds   1  ALPRAZolam 0 25 MG Oral Tablet; TAKE 1 TABLET 3 TIMES DAILY AS NEEDED; Therapy: 34Anm5460 to (Evaluate:07Sjl0622); Last Rx:70Lgx2469 Ordered   2  Daily Multivitamin TABS; Therapy: (Recorded:85Vrz9582) to Recorded   3  Escitalopram Oxalate 10 MG Oral Tablet; TAKE 1/2  TABLET BY MOUTH EVERY DAY; Therapy: 76Ale7627 to (Evaluate:14Oua5674)  Requested for: 54HQS1948; Last   Rx:55Wpk5092 Ordered   4   Glucosamine-Chondroitin DS Oral Tablet; TAKE 1 TABLET DAILY AS DIRECTED; Therapy: (Recorded:03Sep2015) to Recorded   5  Magnesium 250 MG Oral Tablet; TAKE 1 TABLET DAILY; Therapy: (Recorded:09Feb2015) to Recorded   6  Mometasone Furoate 0 1 % External Cream; apply daily; Therapy: 87TZR2019 to (Sandrita Cardenas)  Requested for: 05Apr2017; Last   Rx:05Apr2017 Ordered   7  Omega-3 Krill Oil 300 MG Oral Capsule; 1 DAILY; Therapy: (Recorded:03Sep2015) to Recorded   8  Saw Palmetto 450 MG Oral Capsule; 2 tabs daily; Therapy: (Recorded:09Feb2015) to Recorded   9  Vitamin D3 2000 UNIT Oral Capsule; take 1 capsule once daily; Therapy: (Recorded:09Feb2015) to Recorded   10  Xarelto 20 MG Oral Tablet; One tablet daily  Requested for: 05Apr2017; Last    Rx:05Apr2017 Ordered   11  Zyrtec 10 MG TABS; Take 1 tablet daily; Therapy: (Recorded:09Feb2015) to Recorded    Allergies    1  No Known Drug Allergies    2  No Known Environmental Allergies   3   No Known Food Allergies    Signatures   Electronically signed by : Mele Dai, ; Apr 26 2017  9:54AM EST                       (Author)

## 2018-01-18 DIAGNOSIS — E78.00 PURE HYPERCHOLESTEROLEMIA: ICD-10-CM

## 2018-01-18 DIAGNOSIS — I82.422 EMBOLISM AND THROMBOSIS OF LEFT ILIAC VEIN (HCC): ICD-10-CM

## 2018-01-18 NOTE — MISCELLANEOUS
Message  wife called stating pt was given rx for xarelto and oxycodone when d/c  however oxycodone rx was signed by a pa (she doesn't recall name) and pharmacy would only give 3 day supply  she is req rx  pt sched for f/u in March s/p dvt/lysis  discussed w/ Dr Mc Timmons -  req pt see provider and if appropriate will be given script at time of visit  [facility] apt [facility]  wife agrees to apt, transf her to [facility] to sched  Active Problems    1  Abnormal CT of the abdomen (793 6) (R93 5)   2  Anxiety (300 00) (F41 9)   3  HTN, white coat (796 2) (R03 0)   4  Hypercholesteremia (272 0) (E78 00)   5  Need for prophylactic vaccination and inoculation against influenza (V04 81) (Z23)   6  Osteoarthritis (715 90) (M19 90)    Current Meds   1  ALPRAZolam 0 25 MG Oral Tablet; TAKE 1 TABLET 3 TIMES DAILY AS NEEDED; Therapy: 01Sep2015 to (Evaluate:28Oyp3010); Last Rx:16Jan2017 Ordered   2  Aspirin 81 MG TABS; TAKE 1 TABLET DAILY; Therapy: (Recorded:11Yek6220) to Recorded   3  Daily Multivitamin TABS; Therapy: (Recorded:97Owd8374) to Recorded   4  Escitalopram Oxalate 10 MG Oral Tablet; TAKE 1/2  TABLET BY MOUTH EVERY DAY; Therapy: 03Sep2015 to (Evaluate:84Zhw8534)  Requested for: 09UUY7726; Last   Rx:52Pqe1842 Ordered   5  Glucosamine-Chondroitin DS Oral Tablet; TAKE 1 TABLET DAILY AS DIRECTED; Therapy: (Recorded:65Con5668) to Recorded   6  Magnesium 250 MG Oral Tablet; TAKE 1 TABLET DAILY; Therapy: (Recorded:06Ciz8988) to Recorded   7  Omega-3 Krill Oil 300 MG Oral Capsule; 1 DAILY; Therapy: (Recorded:03Sep2015) to Recorded   8  Saw Palmetto 450 MG Oral Capsule; 2 tabs daily; Therapy: (Recorded:06Yut6373) to Recorded   9  Vitamin D3 2000 UNIT Oral Capsule; take 1 capsule once daily; Therapy: (Recorded:20Vrg2207) to Recorded   10  Zyrtec 10 MG TABS; Take 1 tablet daily; Therapy: (Recorded:58Ggv8318) to Recorded    Allergies    1   No Known Drug Allergies    Signatures Electronically signed by : John Julien, ; Feb 14 2017  1:36PM EST                       (Author)

## 2018-01-18 NOTE — MISCELLANEOUS
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, the provider would like to proceed with a Ct to rule out adrenal mass  This was reviewed with patient when he was in the office on Tuesday 10/11/16  Patient agreed to schedule CT and requested it be done tomorrow  Told patient I would call him once I have it scheduled  Schedule CT abdomen and pelvis with adrenal protocal for tomorrow 10/13/16 at 8:45am at the [facility]  Nothing to eat 4 hours prior to test and to drink plenty of clear liquids the night before and the day of the test  He is to drink one bottle of barium the night before and then half of the second bottle an hour before his test  The 2nd half of the second bottle he is to bring with him to his appointment  Called patient and informed him of the appointment, time, place, and instructions listed above  Told patient he could come to the office and  the barium anytime up until 4:30pm today  Patient verbalized understanding and requested that Dr Cooley call him between today and tomorrow morning to explain to him why he needs to have this test done  Told patient I would pass the message along but Dr Cooley is in the OR currently doing surgeries  Patient verbalized understanding  Martha Wood - 12 Oct 2016 3:29 PM     TASK EDITED  Paged Dr Cooley to call the office   Laure Velasquez returned call and informed her of Patients concerns as to way he needs to have the CT done and he would like Dr Cooley to call his wife Tab Joseph before his CT tomorrow morning  Provided both numbers to call    before 4:30pm [phone]  Janice Michellefabiola after 4:30pm [phone]  Agustin Fillers - 12 Oct 2016 3:54 PM     TASK EDITED        Active Problems    1  Adrenal mass (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 9) (E27 9)   2  Anxiety (300 00) (F41 9)   3  HTN, white coat (796 2) (R03 0)   4  Hypercholesteremia (272 0) (E78 00)   5  Lipid screening (V77 91) (Z13 220)   6  Osteoarthritis (715 90) (M19 90)    Current Meds   1  ALPRAZolam 0 25 MG Oral Tablet; TAKE 1 TABLET 3 TIMES DAILY AS NEEDED; Therapy: 01Sep2015 to (Evaluate:02Oct2016); Last Rx:[id number] Ordered   2  Aspirin 81 MG TABS; TAKE 1 TABLET DAILY; Therapy: (Recorded:09Feb2015) to Recorded   3  Daily Multivitamin TABS; Therapy: (Recorded:09Feb2015) to Recorded   4  Escitalopram Oxalate 10 MG Oral Tablet; take 1 tablet by mouth every day; Therapy: 03Sep2015 to (Evaluate:[id number])  Requested for: [id number]; Last   Rx:22Jun2016 Ordered   5  Glucosamine-Chondroitin DS Oral Tablet; TAKE 1 TABLET DAILY AS DIRECTED; Therapy: (Recorded:03Sep2015) to Recorded   6  Magnesium 250 MG Oral Tablet; TAKE 1 TABLET DAILY; Therapy: (Recorded:09Feb2015) to Recorded   7  Omega-3 Krill Oil 300 MG Oral Capsule; 1 DAILY; Therapy: (Recorded:03Sep2015) to Recorded   8  Saw Palmetto 450 MG Oral Capsule; 2 tabs daily; Therapy: (Recorded:09Feb2015) to Recorded   9  Vitamin D3 2000 UNIT Oral Capsule; take 1 capsule once daily; Therapy: (Recorded:09Feb2015) to Recorded   10  Zyrtec 10 MG TABS; Take 1 tablet daily; Therapy: (Recorded:09Feb2015) to Recorded    Allergies    1   No Known Drug Allergies    Signatures   Electronically signed by : Rebeka De La Vega, ; Oct 17 2016  1:26PM EST                       (Author)

## 2018-01-30 ENCOUNTER — OFFICE VISIT (OUTPATIENT)
Dept: INTERNAL MEDICINE CLINIC | Facility: CLINIC | Age: 68
End: 2018-01-30
Payer: COMMERCIAL

## 2018-01-30 VITALS
TEMPERATURE: 98.1 F | BODY MASS INDEX: 30.18 KG/M2 | SYSTOLIC BLOOD PRESSURE: 156 MMHG | HEIGHT: 69 IN | HEART RATE: 72 BPM | DIASTOLIC BLOOD PRESSURE: 86 MMHG | RESPIRATION RATE: 15 BRPM | WEIGHT: 203.8 LBS

## 2018-01-30 DIAGNOSIS — E78.5 HYPERLIPIDEMIA: ICD-10-CM

## 2018-01-30 DIAGNOSIS — F41.9 ANXIETY: ICD-10-CM

## 2018-01-30 DIAGNOSIS — Z12.11 COLON CANCER SCREENING: ICD-10-CM

## 2018-01-30 DIAGNOSIS — R21 RASH: ICD-10-CM

## 2018-01-30 DIAGNOSIS — I82.522 CHRONIC DEEP VEIN THROMBOSIS (DVT) OF ILIAC VEIN OF LEFT LOWER EXTREMITY (HCC): Primary | ICD-10-CM

## 2018-01-30 PROCEDURE — 99214 OFFICE O/P EST MOD 30 MIN: CPT | Performed by: INTERNAL MEDICINE

## 2018-01-30 RX ORDER — MOMETASONE FUROATE 1 MG/G
1 CREAM TOPICAL DAILY PRN
Qty: 45 G | Refills: 0 | Status: SHIPPED | OUTPATIENT
Start: 2018-01-30 | End: 2018-04-30 | Stop reason: SDUPTHER

## 2018-01-30 RX ORDER — ACETAMINOPHEN 160 MG
1 TABLET,DISINTEGRATING ORAL DAILY
COMMUNITY

## 2018-01-30 RX ORDER — CETIRIZINE HYDROCHLORIDE 10 MG/1
1 TABLET ORAL DAILY
COMMUNITY

## 2018-01-30 RX ORDER — ESCITALOPRAM OXALATE 10 MG/1
0.5 TABLET ORAL DAILY
COMMUNITY
Start: 2015-09-03 | End: 2018-02-26 | Stop reason: SDUPTHER

## 2018-01-30 RX ORDER — ALPRAZOLAM 0.25 MG/1
0.5 TABLET ORAL DAILY
COMMUNITY
Start: 2015-09-01 | End: 2018-01-30 | Stop reason: SDUPTHER

## 2018-01-30 RX ORDER — ERGOCALCIFEROL (VITAMIN D2) 10 MCG
1 TABLET ORAL DAILY
COMMUNITY

## 2018-01-30 RX ORDER — ALPRAZOLAM 0.25 MG/1
0.25 TABLET ORAL 3 TIMES DAILY PRN
Qty: 30 TABLET | Refills: 0 | Status: SHIPPED | OUTPATIENT
Start: 2018-01-30 | End: 2018-02-26 | Stop reason: SDUPTHER

## 2018-01-30 RX ORDER — MAG HYDROX/ALUMINUM HYD/SIMETH 400-400-40
2 SUSPENSION, ORAL (FINAL DOSE FORM) ORAL DAILY
COMMUNITY

## 2018-01-30 RX ORDER — MULTIVITAMIN WITH IRON
1 TABLET ORAL DAILY
COMMUNITY

## 2018-01-30 RX ORDER — MOMETASONE FUROATE 1 MG/G
1 CREAM TOPICAL AS NEEDED
COMMUNITY
End: 2018-01-30 | Stop reason: SDUPTHER

## 2018-01-30 NOTE — PROGRESS NOTES
[facility]      NAME: Mari Hong  AGE: 74 y o  SEX: male  : 1950   MRN: [id number]    DATE: 2018  TIME: 11:48 AM    Assessment and Plan   1  Chronic deep vein thrombosis (DVT) of iliac vein of left lower extremity (HCC)  The patient is doing well in this regard  He has minimal leg swelling in no significant discomfort  He is tolerating Xarelto  - CBC and differential; Future  - Comprehensive metabolic panel; Future  - CBC and differential  - Comprehensive metabolic panel    2  Hyperlipidemia   the LDL has improved on his current dietary therapy  - Lipid panel; Future  - Lipid panel    3  Anxiety    Stable on alprazolam  - ALPRAZolam (XANAX) 0 25 mg tablet; Take 1 tablet (0 25 mg total) by mouth 3 (three) times a day as needed for anxiety  Dispense: 30 tablet; Refill: 0    4  Rash   using mometasone cream as needed  - mometasone (ELOCON) 0 1 % cream; Apply 1 application topically daily as needed (itch)  Dispense: 45 g; Refill: 0     the patient is doing well overall  He will continue anticoagulation with Xarelto  It is anticipated that this will be lifelong  His anxiety is well controlled  His lipids have improved  He will be due for colonoscopy this year and will see Dr Rod Yoder regarding this  He will continue his medications as they are  Updated lab work has been requested for 6 months  Return to office in:  6 months    Chief Complaint    routine follow-up    History of Present Illness      the patient returns to the office for re-evaluation of chronic deep vein thrombosis of the left leg, hyperlipidemia, labile hypertension, and anxiety  Since his last visit he has been feeling generally well  He does not have much swelling in his left leg  He has occasional groin pain both in the left and right  This is not been severe or persistent  He has no significant pain in his leg  He has had no pulmonary symptoms of any sort  He is tolerating Xarelto  His anxiety is well controlled on an occasional alprazolam   He has been on dietary therapy for hyperlipidemia  He is having no problems with his medications  The following portions of the patient's history were reviewed and updated as appropriate: allergies, current medications, past family history, past medical history, past social history, past surgical history and problem list     Review of Systems   Review of Systems   Constitutional: Negative  HENT: Negative for congestion, ear pain, postnasal drip, rhinorrhea, sore throat and trouble swallowing  Eyes: Negative for pain, discharge, redness and visual disturbance  Respiratory: Negative for cough, shortness of breath and wheezing  Cardiovascular: Negative  Gastrointestinal: Negative  Endocrine: Negative  Genitourinary: Negative for difficulty urinating, dysuria, frequency, hematuria and urgency  Musculoskeletal: Negative for arthralgias, gait problem, joint swelling and myalgias  Skin: Negative for rash  Neurological: Negative for dizziness, speech difficulty, weakness, light-headedness, numbness and headaches  Hematological: Negative  Psychiatric/Behavioral: Negative for confusion, decreased concentration, dysphoric mood and sleep disturbance  The patient is not nervous/anxious  Active Problem List     Patient Active Problem List   Diagnosis    Deep vein thrombosis (DVT) of iliac vein of left lower extremity (HCC)    Hyperlipidemia    Arthritis    Anxiety    White coat hypertension    Elevated BP without diagnosis of hypertension    Osteoarthritis       Objective   /86 (BP Location: Left arm)   Pulse 72   Temp 98 1 °F (36 7 °C)   Resp 15   Ht 5' 9" (1 753 m)   Wt 92 4 kg (203 lb 12 8 oz)   BMI 30 10 kg/m²     Physical Exam   Constitutional: He is oriented to person, place, and time  He appears well-developed and well-nourished  No distress  HENT:   Head: Normocephalic and atraumatic     Neck: Normal range of motion  Neck supple  No JVD present  No tracheal deviation present  No thyromegaly present  Cardiovascular: Normal rate, regular rhythm, normal heart sounds and intact distal pulses  Exam reveals no gallop and no friction rub  No murmur heard  Pulmonary/Chest: Effort normal and breath sounds normal  No respiratory distress  He has no wheezes  He has no rales  He exhibits no tenderness  Abdominal: Soft  Bowel sounds are normal  He exhibits no distension and no mass  There is no tenderness  There is no rebound and no guarding  Lymphadenopathy:     He has no cervical adenopathy  Neurological: He is oriented to person, place, and time  Psychiatric: He has a normal mood and affect   His behavior is normal  Judgment and thought content normal        Pertinent Laboratory/Diagnostic Studies:    Recent LDL was 138    Current Medications     Current Outpatient Prescriptions:     ALPRAZolam (XANAX) 0 25 mg tablet, Take 1 tablet (0 25 mg total) by mouth 3 (three) times a day as needed for anxiety, Disp: 30 tablet, Rfl: 0    cetirizine (ZYRTEC ALLERGY) 10 mg tablet, Take 1 tablet by mouth daily, Disp: , Rfl:     Cholecalciferol (VITAMIN D3) 2000 units capsule, Take 1 capsule by mouth daily, Disp: , Rfl:     escitalopram (LEXAPRO) 10 mg tablet, Take 0 5 tablets by mouth daily, Disp: , Rfl:     Glucos-Chondroit-Hyaluron-MSM (GLUCOSAMINE CHONDROITIN JOINT PO), Take 1 tablet by mouth daily, Disp: , Rfl:     Magnesium 250 MG TABS, Take 1 tablet by mouth daily, Disp: , Rfl:     mometasone (ELOCON) 0 1 % cream, Apply 1 application topically daily as needed (itch), Disp: 45 g, Rfl: 0    Multiple Vitamin (DAILY VALUE MULTIVITAMIN) TABS, Take 1 tablet by mouth daily, Disp: , Rfl:     Omega-3 300 MG CAPS, Take 1 tablet by mouth daily, Disp: , Rfl:     rivaroxaban (XARELTO) 20 mg tablet, Take 1 tablet by mouth daily, Disp: , Rfl:     Saw [place] 450 MG CAPS, Take 2 tablets by mouth daily, Disp: , Rfl:    aspirin 81 MG tablet, Take 81 mg by mouth daily  , Disp: , Rfl:     Health Maintenance   There are no preventive care reminders to display for this patient    Immunization History   Administered Date(s) Administered    Influenza Quadrivalent, 6-35 Months IM 10/05/2015, 12/01/2016    Pneumococcal Conjugate 13-Valent 04/05/2017       Reza Godwin MD

## 2018-02-26 DIAGNOSIS — F41.9 ANXIETY: ICD-10-CM

## 2018-02-26 DIAGNOSIS — F32.A DEPRESSION, UNSPECIFIED DEPRESSION TYPE: Primary | ICD-10-CM

## 2018-02-27 RX ORDER — ESCITALOPRAM OXALATE 10 MG/1
5 TABLET ORAL DAILY
Qty: 50 TABLET | Refills: 0 | OUTPATIENT
Start: 2018-02-27 | End: 2018-09-16 | Stop reason: SDUPTHER

## 2018-02-27 RX ORDER — ALPRAZOLAM 0.25 MG/1
TABLET ORAL
Qty: 50 TABLET | Refills: 0 | OUTPATIENT
Start: 2018-02-27 | End: 2018-04-30 | Stop reason: SDUPTHER

## 2018-04-30 DIAGNOSIS — R21 RASH: ICD-10-CM

## 2018-04-30 DIAGNOSIS — I82.409 DVT (DEEP VENOUS THROMBOSIS) (HCC): Primary | ICD-10-CM

## 2018-04-30 DIAGNOSIS — F41.9 ANXIETY: ICD-10-CM

## 2018-05-02 RX ORDER — ALPRAZOLAM 0.25 MG/1
TABLET ORAL
Qty: 50 TABLET | Refills: 0 | OUTPATIENT
Start: 2018-05-02 | End: 2018-07-31 | Stop reason: SDUPTHER

## 2018-05-02 RX ORDER — MOMETASONE FUROATE 1 MG/G
CREAM TOPICAL DAILY
Qty: 45 G | Refills: 1 | Status: SHIPPED | OUTPATIENT
Start: 2018-05-02 | End: 2018-07-31 | Stop reason: SDUPTHER

## 2018-05-02 RX ORDER — MOMETASONE FUROATE 1 MG/G
1 CREAM TOPICAL DAILY PRN
Qty: 45 G | Refills: 0 | Status: SHIPPED | OUTPATIENT
Start: 2018-05-02 | End: 2018-07-31 | Stop reason: SDUPTHER

## 2018-07-31 ENCOUNTER — OFFICE VISIT (OUTPATIENT)
Dept: INTERNAL MEDICINE CLINIC | Facility: CLINIC | Age: 68
End: 2018-07-31
Payer: COMMERCIAL

## 2018-07-31 VITALS
WEIGHT: 198 LBS | DIASTOLIC BLOOD PRESSURE: 88 MMHG | RESPIRATION RATE: 16 BRPM | SYSTOLIC BLOOD PRESSURE: 178 MMHG | BODY MASS INDEX: 29.33 KG/M2 | TEMPERATURE: 97.6 F | HEART RATE: 72 BPM | HEIGHT: 69 IN

## 2018-07-31 DIAGNOSIS — R21 RASH: ICD-10-CM

## 2018-07-31 DIAGNOSIS — E78.00 HYPERCHOLESTEROLEMIA: Primary | ICD-10-CM

## 2018-07-31 DIAGNOSIS — I10 ESSENTIAL HYPERTENSION: ICD-10-CM

## 2018-07-31 DIAGNOSIS — F41.9 ANXIETY: ICD-10-CM

## 2018-07-31 DIAGNOSIS — Z23 NEED FOR PNEUMOCOCCAL VACCINE: ICD-10-CM

## 2018-07-31 DIAGNOSIS — I82.522 CHRONIC DEEP VEIN THROMBOSIS (DVT) OF ILIAC VEIN OF LEFT LOWER EXTREMITY (HCC): ICD-10-CM

## 2018-07-31 PROCEDURE — 1036F TOBACCO NON-USER: CPT | Performed by: INTERNAL MEDICINE

## 2018-07-31 PROCEDURE — 99214 OFFICE O/P EST MOD 30 MIN: CPT | Performed by: INTERNAL MEDICINE

## 2018-07-31 PROCEDURE — 90471 IMMUNIZATION ADMIN: CPT | Performed by: INTERNAL MEDICINE

## 2018-07-31 PROCEDURE — 1160F RVW MEDS BY RX/DR IN RCRD: CPT | Performed by: INTERNAL MEDICINE

## 2018-07-31 PROCEDURE — 90732 PPSV23 VACC 2 YRS+ SUBQ/IM: CPT | Performed by: INTERNAL MEDICINE

## 2018-07-31 PROCEDURE — 3008F BODY MASS INDEX DOCD: CPT | Performed by: INTERNAL MEDICINE

## 2018-07-31 RX ORDER — ATORVASTATIN CALCIUM 20 MG/1
20 TABLET, FILM COATED ORAL DAILY
Qty: 90 TABLET | Refills: 1 | Status: SHIPPED | OUTPATIENT
Start: 2018-07-31 | End: 2019-01-31 | Stop reason: SINTOL

## 2018-07-31 RX ORDER — ALPRAZOLAM 0.25 MG/1
TABLET ORAL
Qty: 50 TABLET | Refills: 0 | OUTPATIENT
Start: 2018-07-31 | End: 2018-10-02 | Stop reason: SDUPTHER

## 2018-07-31 RX ORDER — MOMETASONE FUROATE 1 MG/G
1 CREAM TOPICAL DAILY PRN
Qty: 45 G | Refills: 0 | Status: SHIPPED | OUTPATIENT
Start: 2018-07-31 | End: 2018-11-19 | Stop reason: SDUPTHER

## 2018-07-31 NOTE — PROGRESS NOTES
[facility] [place]      NAME: Liban Stafford  AGE: 79 y o  SEX: male  : 1950   MRN: [id number]    DATE: 2018  TIME: 5:30 PM    Assessment and Plan   1  Anxiety  The patient is using alprazolam sparingly and with good effect   - ALPRAZolam (XANAX) 0 25 mg tablet; 1/2 tablet three times a day as needed  Dispense: 50 tablet; Refill: 0    2  Rash  - mometasone (ELOCON) 0 1 % cream; Apply 1 application topically daily as needed (itch)  Dispense: 45 g; Refill: 0    3  Hypercholesterolemia  The patient's cholesterol is significantly elevated and I believe he would benefit from medication   - atorvastatin (LIPITOR) 20 mg tablet; Take 1 tablet (20 mg total) by mouth daily  Dispense: 90 tablet; Refill: 1  - Comprehensive metabolic panel; Future  - Lipid panel; Future    4  Essential hypertension  Adequately controlled on diet overall  The patient monitors his pressure at home  5  Chronic deep vein thrombosis (DVT) of iliac vein of left lower extremity (HCC)  Continue Xarelto  He is tolerating this well  - rivaroxaban (XARELTO) 20 mg tablet; Take 1 tablet (20 mg total) by mouth daily  Dispense: 90 tablet; Refill: 0    6  Need for pneumococcal vaccine  - PNEUMOCOCCAL POLYSACCHARIDE VACCINE 23-VALENT =>1YO SQ IM    The patient is doing generally well  He will continue anticoagulation indefinitely after a very large an unprovoked DVT  Currently, he is tolerating this very well  He will start on atorvastatin for his cholesterol  His blood pressure is stable without medication  He was given a Pneumovax today to complete his pneumococcal vaccine series      Return to office in:   6 months    Chief Complaint     Chief Complaint   Patient presents with    Follow-up     6 months, colonoscopy due insurance change will call  [facility], 1 fall, feel off a ladder several months ago, shoulder pain       History of Present Illness     The patient returned to the office for re-evaluation of hypercholesterolemia, DVT, and labile hypertension  Since his last visit he has been feeling pretty well  He is occasionally anxious and uses a small amount of alprazolam he has been sleeping well  He has had no chest pain, shortness of breath, palpitations, or dizziness  He has had no leg swelling, pain, etc   He is tolerating his medications well  The following portions of the patient's history were reviewed and updated as appropriate: allergies, current medications, past family history, past medical history, past social history, past surgical history and problem list     Review of Systems   Review of Systems   Constitutional: Negative  HENT: Negative for congestion, ear pain, postnasal drip, rhinorrhea, sore throat and trouble swallowing  Eyes: Negative for pain, discharge, redness and visual disturbance  Respiratory: Negative for cough, shortness of breath and wheezing  Cardiovascular: Negative  Gastrointestinal: Negative  Endocrine: Negative  Genitourinary: Negative for difficulty urinating, dysuria, frequency, hematuria and urgency  Musculoskeletal: Negative for arthralgias, gait problem, joint swelling and myalgias  Skin: Negative for rash  Neurological: Negative for dizziness, speech difficulty, weakness, light-headedness, numbness and headaches  Hematological: Negative  Psychiatric/Behavioral: Negative for confusion, decreased concentration, dysphoric mood and sleep disturbance  The patient is not nervous/anxious          Active Problem List     Patient Active Problem List   Diagnosis    Deep vein thrombosis (DVT) of iliac vein of left lower extremity (HCC)    Hypercholesterolemia    Arthritis    Anxiety    Elevated BP without diagnosis of hypertension    Osteoarthritis    Hypertension    Rash       Objective   BP (!) 178/88 (BP Location: Left arm, Patient Position: Sitting, Cuff Size: Standard)   Pulse 72   Temp 97 6 °F (36 4 °C) (Tympanic)   Resp 16   Ht 5' 9" (1 753 m)   Wt 89 8 kg (198 lb)   BMI 29 24 kg/m²     Physical Exam   Constitutional: He is oriented to person, place, and time  He appears well-developed and well-nourished  No distress  HENT:   Head: Normocephalic and atraumatic  Neck: Neck supple  No JVD present  No tracheal deviation present  No thyromegaly present  Cardiovascular: Normal rate, regular rhythm, normal heart sounds and intact distal pulses  Exam reveals no gallop and no friction rub  No murmur heard  Pulmonary/Chest: Effort normal and breath sounds normal  He has no wheezes  He has no rales  He exhibits no tenderness  Abdominal: Soft  Bowel sounds are normal  He exhibits no distension and no mass  There is no tenderness  There is no rebound  Musculoskeletal: Normal range of motion  He exhibits no edema or tenderness  Lymphadenopathy:     He has no cervical adenopathy  Neurological: He is alert and oriented to person, place, and time  Skin: Skin is warm and dry  Psychiatric: He has a normal mood and affect  His behavior is normal  Judgment and thought content normal        Pertinent Laboratory/Diagnostic Studies:  No visits with results within 3 Month(s) from this visit     Latest known visit with results is:   Admission on 02/10/2017, Discharged on 02/12/2017   Component Date Value Ref Range Status    Fibrinogen 02/10/2017 359  227 - 495 mg/dL Final    PTT 02/10/2017 76* 24 - 36 seconds Final    Fibrinogen 02/10/2017 334  227 - 495 mg/dL Final    WBC 02/10/2017 7 32  4 31 - 10 16 Thousand/uL Final    RBC 02/10/2017 4 20  3 88 - 5 62 Million/uL Final    Hemoglobin 02/10/2017 13 1  12 0 - 17 0 g/dL Final    Hematocrit 02/10/2017 39 0  36 5 - 49 3 % Final    MCV 02/10/2017 93  82 - 98 fL Final    MCH 02/10/2017 31 2  26 8 - 34 3 pg Final    MCHC 02/10/2017 33 6  31 4 - 37 4 g/dL Final    RDW 02/10/2017 13 8  11 6 - 15 1 % Final    Platelets 67/12/6435 205  149 - 390 Thousands/uL Final    MPV 02/10/2017 10 4 8 9 - 12 7 fL Final    Fibrinogen 02/11/2017 321  227 - 495 mg/dL Final    PTT 02/11/2017 33  24 - 36 seconds Final    WBC 02/11/2017 8 40  4 31 - 10 16 Thousand/uL Final    RBC 02/11/2017 4 41  3 88 - 5 62 Million/uL Final    Hemoglobin 02/11/2017 13 5  12 0 - 17 0 g/dL Final    Hematocrit 02/11/2017 40 8  36 5 - 49 3 % Final    MCV 02/11/2017 93  82 - 98 fL Final    MCH 02/11/2017 30 6  26 8 - 34 3 pg Final    MCHC 02/11/2017 33 1  31 4 - 37 4 g/dL Final    RDW 02/11/2017 13 7  11 6 - 15 1 % Final    Platelets 30/46/0280 201  149 - 390 Thousands/uL Final    MPV 02/11/2017 10 0  8 9 - 12 7 fL Final    Sodium 02/11/2017 143  136 - 145 mmol/L Final    Potassium 02/11/2017 3 4* 3 5 - 5 3 mmol/L Final    Chloride 02/11/2017 108  100 - 108 mmol/L Final    CO2 02/11/2017 24  21 - 32 mmol/L Final    Anion Gap 02/11/2017 11  4 - 13 mmol/L Final    BUN 02/11/2017 13  5 - 25 mg/dL Final    Creatinine 02/11/2017 0 82  0 60 - 1 30 mg/dL Final    Glucose 02/11/2017 104  65 - 140 mg/dL Final    Calcium 02/11/2017 8 4  8 3 - 10 1 mg/dL Final    eGFR 02/11/2017 >60 0  ml/min/1 73sq m Final    Fibrinogen 02/11/2017 189* 227 - 495 mg/dL Final    PTT 02/11/2017 38* 24 - 36 seconds Final    WBC 02/11/2017 7 05  4 31 - 10 16 Thousand/uL Final    RBC 02/11/2017 4 36  3 88 - 5 62 Million/uL Final    Hemoglobin 02/11/2017 13 3  12 0 - 17 0 g/dL Final    Hematocrit 02/11/2017 40 4  36 5 - 49 3 % Final    MCV 02/11/2017 93  82 - 98 fL Final    MCH 02/11/2017 30 5  26 8 - 34 3 pg Final    MCHC 02/11/2017 32 9  31 4 - 37 4 g/dL Final    RDW 02/11/2017 13 9  11 6 - 15 1 % Final    Platelets 92/98/8005 188  149 - 390 Thousands/uL Final    MPV 02/11/2017 9 9  8 9 - 12 7 fL Final    PTT 02/11/2017 89* 24 - 36 seconds Final    Protime 02/11/2017 14 3  12 0 - 14 3 seconds Final    INR 02/11/2017 1 10  0 86 - 1 16 Final    WBC 02/12/2017 6 84  4 31 - 10 16 Thousand/uL Final    RBC 02/12/2017 3 82* 3 88 - 5 62 Million/uL Final    Hemoglobin 02/12/2017 11 7* 12 0 - 17 0 g/dL Final    Hematocrit 02/12/2017 35 4* 36 5 - 49 3 % Final    MCV 02/12/2017 93  82 - 98 fL Final    MCH 02/12/2017 30 6  26 8 - 34 3 pg Final    MCHC 02/12/2017 33 1  31 4 - 37 4 g/dL Final    RDW 02/12/2017 14 0  11 6 - 15 1 % Final    Platelets 71/31/2235 172  149 - 390 Thousands/uL Final    MPV 02/12/2017 10 4  8 9 - 12 7 fL Final    Sodium 02/12/2017 142  136 - 145 mmol/L Final    Potassium 02/12/2017 3 6  3 5 - 5 3 mmol/L Final    Chloride 02/12/2017 109* 100 - 108 mmol/L Final    CO2 02/12/2017 24  21 - 32 mmol/L Final    Anion Gap 02/12/2017 9  4 - 13 mmol/L Final    BUN 02/12/2017 11  5 - 25 mg/dL Final    Creatinine 02/12/2017 0 72  0 60 - 1 30 mg/dL Final    Glucose 02/12/2017 100  65 - 140 mg/dL Final    Calcium 02/12/2017 7 9* 8 3 - 10 1 mg/dL Final    eGFR 02/12/2017 >60 0  ml/min/1 73sq m Final    PTT 02/12/2017 97* 24 - 36 seconds Final    PTT 02/12/2017 71* 24 - 36 seconds Final           Current Medications     Current Outpatient Prescriptions:     ALPRAZolam (XANAX) 0 25 mg tablet, 1/2 tablet three times a day as needed, Disp: 50 tablet, Rfl: 0    cetirizine (ZYRTEC ALLERGY) 10 mg tablet, Take 1 tablet by mouth daily, Disp: , Rfl:     Cholecalciferol (VITAMIN D3) 2000 units capsule, Take 1 capsule by mouth daily, Disp: , Rfl:     escitalopram (LEXAPRO) 10 mg tablet, Take 0 5 tablets (5 mg total) by mouth daily, Disp: 50 tablet, Rfl: 0    Glucos-Chondroit-Hyaluron-MSM (GLUCOSAMINE CHONDROITIN JOINT PO), Take 1 tablet by mouth daily, Disp: , Rfl:     Magnesium 250 MG TABS, Take 1 tablet by mouth daily, Disp: , Rfl:     mometasone (ELOCON) 0 1 % cream, Apply 1 application topically daily as needed (itch), Disp: 45 g, Rfl: 0    Multiple Vitamin (DAILY VALUE MULTIVITAMIN) TABS, Take 1 tablet by mouth daily, Disp: , Rfl:     Omega-3 300 MG CAPS, Take 1 tablet by mouth daily, Disp: , Rfl:     rivaroxaban (XARELTO) 20 mg tablet, Take 1 tablet (20 mg total) by mouth daily, Disp: 90 tablet, Rfl: 0    Saw Palmetto 450 MG CAPS, Take 2 tablets by mouth daily, Disp: , Rfl:     atorvastatin (LIPITOR) 20 mg tablet, Take 1 tablet (20 mg total) by mouth daily, Disp: 90 tablet, Rfl: 1      Hiral Baker MD

## 2018-09-16 DIAGNOSIS — F32.A DEPRESSION, UNSPECIFIED DEPRESSION TYPE: ICD-10-CM

## 2018-09-19 RX ORDER — ESCITALOPRAM OXALATE 10 MG/1
TABLET ORAL
Qty: 50 TABLET | Refills: 3 | Status: SHIPPED | OUTPATIENT
Start: 2018-09-19 | End: 2019-01-31 | Stop reason: SDUPTHER

## 2018-10-02 DIAGNOSIS — F41.9 ANXIETY: ICD-10-CM

## 2018-10-03 RX ORDER — ALPRAZOLAM 0.25 MG/1
TABLET ORAL
Qty: 50 TABLET | Refills: 0 | Status: SHIPPED | OUTPATIENT
Start: 2018-10-03 | End: 2018-12-14 | Stop reason: SDUPTHER

## 2018-11-19 DIAGNOSIS — R21 RASH: ICD-10-CM

## 2018-11-20 RX ORDER — MOMETASONE FUROATE 1 MG/G
CREAM TOPICAL
Qty: 45 G | Refills: 0 | Status: SHIPPED | OUTPATIENT
Start: 2018-11-20 | End: 2019-01-31 | Stop reason: SDUPTHER

## 2018-12-14 DIAGNOSIS — F41.9 ANXIETY: ICD-10-CM

## 2018-12-17 RX ORDER — ALPRAZOLAM 0.25 MG/1
TABLET ORAL
Qty: 50 TABLET | Refills: 0 | Status: SHIPPED | OUTPATIENT
Start: 2018-12-17 | End: 2019-01-31 | Stop reason: SDUPTHER

## 2019-01-28 DIAGNOSIS — I82.522 CHRONIC DEEP VEIN THROMBOSIS (DVT) OF ILIAC VEIN OF LEFT LOWER EXTREMITY (HCC): ICD-10-CM

## 2019-01-29 RX ORDER — RIVAROXABAN 20 MG/1
TABLET, FILM COATED ORAL
Qty: 90 TABLET | Refills: 0 | Status: SHIPPED | OUTPATIENT
Start: 2019-01-29 | End: 2019-04-24 | Stop reason: SDUPTHER

## 2019-01-31 ENCOUNTER — OFFICE VISIT (OUTPATIENT)
Dept: INTERNAL MEDICINE CLINIC | Facility: CLINIC | Age: 69
End: 2019-01-31
Payer: COMMERCIAL

## 2019-01-31 VITALS
TEMPERATURE: 97.5 F | SYSTOLIC BLOOD PRESSURE: 150 MMHG | HEIGHT: 69 IN | RESPIRATION RATE: 15 BRPM | WEIGHT: 205 LBS | BODY MASS INDEX: 30.36 KG/M2 | HEART RATE: 68 BPM | DIASTOLIC BLOOD PRESSURE: 84 MMHG

## 2019-01-31 DIAGNOSIS — M15.9 PRIMARY OSTEOARTHRITIS INVOLVING MULTIPLE JOINTS: ICD-10-CM

## 2019-01-31 DIAGNOSIS — Z12.11 SCREENING FOR COLON CANCER: ICD-10-CM

## 2019-01-31 DIAGNOSIS — I10 ESSENTIAL HYPERTENSION: Primary | ICD-10-CM

## 2019-01-31 DIAGNOSIS — E78.00 HYPERCHOLESTEROLEMIA: ICD-10-CM

## 2019-01-31 DIAGNOSIS — I82.522 CHRONIC DEEP VEIN THROMBOSIS (DVT) OF ILIAC VEIN OF LEFT LOWER EXTREMITY (HCC): ICD-10-CM

## 2019-01-31 DIAGNOSIS — R21 RASH: ICD-10-CM

## 2019-01-31 DIAGNOSIS — Z12.5 SCREENING FOR PROSTATE CANCER: ICD-10-CM

## 2019-01-31 DIAGNOSIS — F32.A DEPRESSION, UNSPECIFIED DEPRESSION TYPE: ICD-10-CM

## 2019-01-31 DIAGNOSIS — F41.9 ANXIETY: ICD-10-CM

## 2019-01-31 PROCEDURE — 1160F RVW MEDS BY RX/DR IN RCRD: CPT | Performed by: INTERNAL MEDICINE

## 2019-01-31 PROCEDURE — 99214 OFFICE O/P EST MOD 30 MIN: CPT | Performed by: INTERNAL MEDICINE

## 2019-01-31 PROCEDURE — 3008F BODY MASS INDEX DOCD: CPT | Performed by: INTERNAL MEDICINE

## 2019-01-31 PROCEDURE — 1036F TOBACCO NON-USER: CPT | Performed by: INTERNAL MEDICINE

## 2019-01-31 RX ORDER — MOMETASONE FUROATE 1 MG/G
CREAM TOPICAL DAILY
Qty: 45 G | Refills: 1 | Status: SHIPPED | OUTPATIENT
Start: 2019-01-31 | End: 2019-08-07 | Stop reason: SDUPTHER

## 2019-01-31 RX ORDER — ALPRAZOLAM 0.25 MG/1
0.25 TABLET ORAL 3 TIMES DAILY PRN
Qty: 90 TABLET | Refills: 1 | Status: SHIPPED | OUTPATIENT
Start: 2019-01-31 | End: 2019-08-07 | Stop reason: SDUPTHER

## 2019-01-31 RX ORDER — ESCITALOPRAM OXALATE 10 MG/1
5 TABLET ORAL DAILY
Qty: 15 TABLET | Refills: 6 | Status: SHIPPED | OUTPATIENT
Start: 2019-01-31 | End: 2019-08-31 | Stop reason: SDUPTHER

## 2019-01-31 NOTE — PROGRESS NOTES
[facility] [place]      NAME: Tab Lucero  AGE: 79 y o  SEX: male  : 1950   MRN: [id number]    DATE: 2019  TIME: 3:42 PM    Assessment and Plan   1  Essential hypertension  Well controlled  - CBC  - Comprehensive metabolic panel    2  Chronic deep vein thrombosis (DVT) of iliac vein of left lower extremity (HCC)  Tolerating Xarelto  He is wearing a left leg compression stocking with good effect  3  Primary osteoarthritis involving multiple joints  The patient is having ongoing knee pain  He may require knee replacement in the near future  4  Hypercholesterolemia  The patient is currently off atorvastatin and fish oil because of side effects  Lipid profile will be repeated  - Lipid panel    5  Rash  This is well controlled with mometasone as needed  - mometasone (ELOCON) 0 1 % cream; Apply topically daily  Dispense: 45 g; Refill: 1    6  Anxiety  Stable on alprazolam as needed  - ALPRAZolam (XANAX) 0 25 mg tablet; Take 1 tablet (0 25 mg total) by mouth 3 (three) times a day as needed for anxiety  Dispense: 90 tablet; Refill: 1    7  Depression, unspecified depression type  Improved with Lexapro  - escitalopram (LEXAPRO) 10 mg tablet; Take 0 5 tablets (5 mg total) by mouth daily  Dispense: 15 tablet; Refill: 6    8  Screening for colon cancer  The patient will contact Dr Tra Cedeno to arrange colonoscopy  - Ambulatory referral to General Surgery; Future    9  Screening for prostate cancer  - PSA, Total Screen; Future    The patient appears to be doing generally well  His blood pressure is well controlled  He is tolerating anticoagulation  I suggested that he try to expedite colonoscopy because it should be done before his knee replacement  He will be seeing Dr Tika Gonzalez in the near future        Return to office in:  6 months    Chief Complaint     Chief Complaint   Patient presents with    Follow-up     6 months, stopped the statin, joint pain, after stopping joint pain resolved    taking apple-cider vinegar and Tumeric  due for colonoscopy       History of Present Illness     The patient returned to the office for re-evaluation of hypertension, hypercholesterolemia, previous deep vein thrombosis, osteoarthritis, and anxiety and depression  Since his last visit he has felt well overall  He has had knee and hip pain much of which is related to arthritis  He was concerned about his hip pain because he has had previous hernia surgery and he has a venous stent in his left leg  He has had no change in the amount of swelling of his left leg  He has had no other swelling or redness of his legs  He has previously been diagnosed with osteoarthritis of the knees  He had a right hip replacement in the past   He has had no chest pain, shortness of breath, palpitations, or dizziness  He is tolerating his medications  The following portions of the patient's history were reviewed and updated as appropriate: allergies, current medications, past family history, past medical history, past social history, past surgical history and problem list     Review of Systems   Review of Systems   Constitutional: Negative  HENT: Negative for congestion, ear pain, postnasal drip, rhinorrhea, sore throat and trouble swallowing  Eyes: Negative for pain, discharge, redness and visual disturbance  Respiratory: Negative for cough, shortness of breath and wheezing  Cardiovascular: Negative  Gastrointestinal: Negative  Endocrine: Negative  Genitourinary: Negative for difficulty urinating, dysuria, frequency, hematuria and urgency  Musculoskeletal: Positive for arthralgias  Negative for gait problem, joint swelling and myalgias  Skin: Negative for rash  Neurological: Negative for dizziness, speech difficulty, weakness, light-headedness, numbness and headaches  Hematological: Negative      Psychiatric/Behavioral: Negative for confusion, decreased concentration, dysphoric mood and sleep disturbance  The patient is not nervous/anxious  Active Problem List     Patient Active Problem List   Diagnosis    Deep vein thrombosis (DVT) of iliac vein of left lower extremity (HCC)    Hypercholesterolemia    Anxiety    Osteoarthritis    Hypertension    Rash       Objective   /84 (BP Location: Left arm, Patient Position: Sitting, Cuff Size: Standard)   Pulse 68   Temp 97 5 °F (36 4 °C) (Tympanic)   Resp 15   Ht 5' 9" (1 753 m)   Wt 93 kg (205 lb)   BMI 30 27 kg/m²     Physical Exam   Constitutional: He is oriented to person, place, and time  He appears well-developed and well-nourished  No distress  HENT:   Head: Normocephalic and atraumatic  Neck: Neck supple  No JVD present  No tracheal deviation present  No thyromegaly present  Cardiovascular: Normal rate, regular rhythm, normal heart sounds and intact distal pulses  Exam reveals no gallop and no friction rub  No murmur heard  Pulmonary/Chest: Effort normal and breath sounds normal  He has no wheezes  He has no rales  He exhibits no tenderness  Abdominal: Soft  Bowel sounds are normal  He exhibits no distension and no mass  There is no tenderness  There is no rebound  Musculoskeletal: Normal range of motion  He exhibits no tenderness  There is mild edema of the left leg  Lymphadenopathy:     He has no cervical adenopathy  Neurological: He is alert and oriented to person, place, and time  Skin: Skin is warm and dry  Psychiatric: He has a normal mood and affect  His behavior is normal  Judgment and thought content normal        Pertinent Laboratory/Diagnostic Studies:  No visits with results within 3 Month(s) from this visit     Latest known visit with results is:   Admission on 02/10/2017, Discharged on 02/12/2017   Component Date Value Ref Range Status    Fibrinogen 02/10/2017 359  227 - 495 mg/dL Final    PTT 02/10/2017 76* 24 - 36 seconds Final    Fibrinogen 02/10/2017 334  227 - 495 mg/dL Final    WBC 02/10/2017 7 32  4 31 - 10 16 Thousand/uL Final    RBC 02/10/2017 4 20  3 88 - 5 62 Million/uL Final    Hemoglobin 02/10/2017 13 1  12 0 - 17 0 g/dL Final    Hematocrit 02/10/2017 39 0  36 5 - 49 3 % Final    MCV 02/10/2017 93  82 - 98 fL Final    MCH 02/10/2017 31 2  26 8 - 34 3 pg Final    MCHC 02/10/2017 33 6  31 4 - 37 4 g/dL Final    RDW 02/10/2017 13 8  11 6 - 15 1 % Final    Platelets 41/76/4054 205  149 - 390 Thousands/uL Final    MPV 02/10/2017 10 4  8 9 - 12 7 fL Final    Fibrinogen 02/11/2017 321  227 - 495 mg/dL Final    PTT 02/11/2017 33  24 - 36 seconds Final    WBC 02/11/2017 8 40  4 31 - 10 16 Thousand/uL Final    RBC 02/11/2017 4 41  3 88 - 5 62 Million/uL Final    Hemoglobin 02/11/2017 13 5  12 0 - 17 0 g/dL Final    Hematocrit 02/11/2017 40 8  36 5 - 49 3 % Final    MCV 02/11/2017 93  82 - 98 fL Final    MCH 02/11/2017 30 6  26 8 - 34 3 pg Final    MCHC 02/11/2017 33 1  31 4 - 37 4 g/dL Final    RDW 02/11/2017 13 7  11 6 - 15 1 % Final    Platelets 03/39/8223 201  149 - 390 Thousands/uL Final    MPV 02/11/2017 10 0  8 9 - 12 7 fL Final    Sodium 02/11/2017 143  136 - 145 mmol/L Final    Potassium 02/11/2017 3 4* 3 5 - 5 3 mmol/L Final    Chloride 02/11/2017 108  100 - 108 mmol/L Final    CO2 02/11/2017 24  21 - 32 mmol/L Final    ANION GAP 02/11/2017 11  4 - 13 mmol/L Final    BUN 02/11/2017 13  5 - 25 mg/dL Final    Creatinine 02/11/2017 0 82  0 60 - 1 30 mg/dL Final    Glucose 02/11/2017 104  65 - 140 mg/dL Final    Calcium 02/11/2017 8 4  8 3 - 10 1 mg/dL Final    eGFR 02/11/2017 >60 0  ml/min/1 73sq m Final    Fibrinogen 02/11/2017 189* 227 - 495 mg/dL Final    PTT 02/11/2017 38* 24 - 36 seconds Final    WBC 02/11/2017 7 05  4 31 - 10 16 Thousand/uL Final    RBC 02/11/2017 4 36  3 88 - 5 62 Million/uL Final    Hemoglobin 02/11/2017 13 3  12 0 - 17 0 g/dL Final    Hematocrit 02/11/2017 40 4  36 5 - 49 3 % Final    MCV 02/11/2017 93 82 - 98 fL Final    MCH 02/11/2017 30 5  26 8 - 34 3 pg Final    MCHC 02/11/2017 32 9  31 4 - 37 4 g/dL Final    RDW 02/11/2017 13 9  11 6 - 15 1 % Final    Platelets 44/28/8347 188  149 - 390 Thousands/uL Final    MPV 02/11/2017 9 9  8 9 - 12 7 fL Final    PTT 02/11/2017 89* 24 - 36 seconds Final    Protime 02/11/2017 14 3  12 0 - 14 3 seconds Final    INR 02/11/2017 1 10  0 86 - 1 16 Final    WBC 02/12/2017 6 84  4 31 - 10 16 Thousand/uL Final    RBC 02/12/2017 3 82* 3 88 - 5 62 Million/uL Final    Hemoglobin 02/12/2017 11 7* 12 0 - 17 0 g/dL Final    Hematocrit 02/12/2017 35 4* 36 5 - 49 3 % Final    MCV 02/12/2017 93  82 - 98 fL Final    MCH 02/12/2017 30 6  26 8 - 34 3 pg Final    MCHC 02/12/2017 33 1  31 4 - 37 4 g/dL Final    RDW 02/12/2017 14 0  11 6 - 15 1 % Final    Platelets 18/07/9891 172  149 - 390 Thousands/uL Final    MPV 02/12/2017 10 4  8 9 - 12 7 fL Final    Sodium 02/12/2017 142  136 - 145 mmol/L Final    Potassium 02/12/2017 3 6  3 5 - 5 3 mmol/L Final    Chloride 02/12/2017 109* 100 - 108 mmol/L Final    CO2 02/12/2017 24  21 - 32 mmol/L Final    ANION GAP 02/12/2017 9  4 - 13 mmol/L Final    BUN 02/12/2017 11  5 - 25 mg/dL Final    Creatinine 02/12/2017 0 72  0 60 - 1 30 mg/dL Final    Glucose 02/12/2017 100  65 - 140 mg/dL Final    Calcium 02/12/2017 7 9* 8 3 - 10 1 mg/dL Final    eGFR 02/12/2017 >60 0  ml/min/1 73sq m Final    PTT 02/12/2017 97* 24 - 36 seconds Final    PTT 02/12/2017 71* 24 - 36 seconds Final           Current Medications     Current Outpatient Prescriptions:     ALPRAZolam (XANAX) 0 25 mg tablet, Take 1 tablet (0 25 mg total) by mouth 3 (three) times a day as needed for anxiety, Disp: 90 tablet, Rfl: 1    cetirizine (ZYRTEC ALLERGY) 10 mg tablet, Take 1 tablet by mouth daily, Disp: , Rfl:     Cholecalciferol (VITAMIN D3) 2000 units capsule, Take 1 capsule by mouth daily, Disp: , Rfl:     escitalopram (LEXAPRO) 10 mg tablet, Take 0 5 tablets (5 mg total) by mouth daily, Disp: 15 tablet, Rfl: 6    Glucos-Chondroit-Hyaluron-MSM (GLUCOSAMINE CHONDROITIN JOINT PO), Take 1 tablet by mouth daily, Disp: , Rfl:     Magnesium 250 MG TABS, Take 1 tablet by mouth daily, Disp: , Rfl:     mometasone (ELOCON) 0 1 % cream, Apply topically daily, Disp: 45 g, Rfl: 1    Multiple Vitamin (DAILY VALUE MULTIVITAMIN) TABS, Take 1 tablet by mouth daily, Disp: , Rfl:     Saw [place] 450 MG CAPS, Take 2 tablets by mouth daily, Disp: , Rfl:     XARELTO 20 MG tablet, TAKE 1 TABLET BY MOUTH EVERY DAY, Disp: 90 tablet, Rfl: 0      Maria Antonia Shell MD Improved

## 2019-02-05 ENCOUNTER — CLINICAL SUPPORT (OUTPATIENT)
Dept: INTERNAL MEDICINE CLINIC | Facility: CLINIC | Age: 69
End: 2019-02-05
Payer: COMMERCIAL

## 2019-02-05 DIAGNOSIS — Z23 ENCOUNTER FOR IMMUNIZATION: Primary | ICD-10-CM

## 2019-02-05 PROCEDURE — 90471 IMMUNIZATION ADMIN: CPT | Performed by: INTERNAL MEDICINE

## 2019-02-05 PROCEDURE — 90750 HZV VACC RECOMBINANT IM: CPT | Performed by: INTERNAL MEDICINE

## 2019-04-03 ENCOUNTER — CLINICAL SUPPORT (OUTPATIENT)
Dept: INTERNAL MEDICINE CLINIC | Facility: CLINIC | Age: 69
End: 2019-04-03
Payer: COMMERCIAL

## 2019-04-03 DIAGNOSIS — Z23 ENCOUNTER FOR IMMUNIZATION: Primary | ICD-10-CM

## 2019-04-03 PROCEDURE — 90471 IMMUNIZATION ADMIN: CPT

## 2019-04-03 PROCEDURE — 90750 HZV VACC RECOMBINANT IM: CPT

## 2019-04-23 ENCOUNTER — TELEPHONE (OUTPATIENT)
Dept: ADMINISTRATIVE | Facility: HOSPITAL | Age: 69
End: 2019-04-23

## 2019-04-23 DIAGNOSIS — I82.422: Primary | ICD-10-CM

## 2019-04-23 DIAGNOSIS — I82.429 ILIAC VEIN THROMBOSIS, UNSPECIFIED LATERALITY (HCC): Primary | ICD-10-CM

## 2019-04-24 DIAGNOSIS — I82.522 CHRONIC DEEP VEIN THROMBOSIS (DVT) OF ILIAC VEIN OF LEFT LOWER EXTREMITY (HCC): ICD-10-CM

## 2019-04-24 RX ORDER — RIVAROXABAN 20 MG/1
TABLET, FILM COATED ORAL
Qty: 90 TABLET | Refills: 0 | Status: SHIPPED | OUTPATIENT
Start: 2019-04-24 | End: 2019-07-24 | Stop reason: SDUPTHER

## 2019-07-11 ENCOUNTER — CONSULT (OUTPATIENT)
Dept: SURGERY | Facility: CLINIC | Age: 69
End: 2019-07-11
Payer: COMMERCIAL

## 2019-07-11 VITALS
BODY MASS INDEX: 28.59 KG/M2 | SYSTOLIC BLOOD PRESSURE: 160 MMHG | WEIGHT: 193.6 LBS | DIASTOLIC BLOOD PRESSURE: 90 MMHG | HEART RATE: 80 BPM | TEMPERATURE: 97.7 F

## 2019-07-11 DIAGNOSIS — Z12.11 SCREENING FOR COLON CANCER: ICD-10-CM

## 2019-07-11 PROCEDURE — 99244 OFF/OP CNSLTJ NEW/EST MOD 40: CPT | Performed by: SURGERY

## 2019-07-11 NOTE — H&P (VIEW-ONLY)
Assessment/Plan:   Kuldip Fallon  is a 81 y o male who is here for a:     Screening Colonoscopy  Plan: Colonoscopy for: Screening for Colon Cancer    Previous history DVT, on Xeralto    Previous Colonoscopy and  Location of polyps if any:   10 years ago and  with polyps in the :   no polyps or not applicable           Preoperative Clearance: None        ______________________________________________________    HPI:  Kuldip Fallon  is a 81 y o male who was referred for evaluation of    Screening  Currently:     Symptoms include:  no abdominal pain, change in bowel habits, or black or bloody stools  Family history of colon cancer: None reported             Anticoagulation: Xeralto    LABS:      Lab Results   Component Value Date    WBC 6 84 02/12/2017    HGB 11 7 (L) 02/12/2017    HCT 35 4 (L) 02/12/2017    MCV 93 02/12/2017     02/12/2017     Lab Results   Component Value Date    K 3 6 02/12/2017     (H) 02/12/2017    CO2 24 02/12/2017    BUN 11 02/12/2017    CREATININE 0 72 02/12/2017    CALCIUM 7 9 (L) 02/12/2017    EGFR >60 0 02/12/2017     No results found for: HGBA1C  Lab Results   Component Value Date    INR 1 10 02/11/2017    PROTIME 14 3 02/11/2017         No orders to display           ROS:  General ROS: negative for - chills, fatigue, fever or night sweats, weight loss  Respiratory ROS: no cough, shortness of breath, or wheezing  Cardiovascular ROS: no chest pain or dyspnea on exertion  Genito-Urinary ROS: no dysuria, trouble voiding, or hematuria  Musculoskeletal ROS: negative for - gait disturbance, joint pain or muscle pain  Neurological ROS: no TIA or stroke symptoms  GI ROS: see HPI  Skin ROS: no new rashes or lesions   Lymphatic ROS: no new adenopathy noted by pt  GYN ROS: see HPI, no new GYN history or bleeding noted  Psy ROS: no new mental or behavioral disturbances           Imaging: No new pertinent imaging studies           Patient Active Problem List Diagnosis    Deep vein thrombosis (DVT) of iliac vein of left lower extremity (HCC)    Hypercholesterolemia    Anxiety    Osteoarthritis    Hypertension    Rash         Allergies:  Atorvastatin      Current Outpatient Medications:     ALPRAZolam (XANAX) 0 25 mg tablet, Take 1 tablet (0 25 mg total) by mouth 3 (three) times a day as needed for anxiety, Disp: 90 tablet, Rfl: 1    cetirizine (ZYRTEC ALLERGY) 10 mg tablet, Take 1 tablet by mouth daily, Disp: , Rfl:     Cholecalciferol (VITAMIN D3) 2000 units capsule, Take 1 capsule by mouth daily, Disp: , Rfl:     escitalopram (LEXAPRO) 10 mg tablet, Take 0 5 tablets (5 mg total) by mouth daily, Disp: 15 tablet, Rfl: 6    Glucos-Chondroit-Hyaluron-MSM (GLUCOSAMINE CHONDROITIN JOINT PO), Take 1 tablet by mouth daily, Disp: , Rfl:     Magnesium 250 MG TABS, Take 1 tablet by mouth daily, Disp: , Rfl:     mometasone (ELOCON) 0 1 % cream, Apply topically daily, Disp: 45 g, Rfl: 1    Multiple Vitamin (DAILY VALUE MULTIVITAMIN) TABS, Take 1 tablet by mouth daily, Disp: , Rfl:     Saw [place] 450 MG CAPS, Take 2 tablets by mouth daily, Disp: , Rfl:     XARELTO 20 MG tablet, TAKE 1 TABLET BY MOUTH EVERY DAY, Disp: 90 tablet, Rfl: 0    Past Medical History:   Diagnosis Date    Anxiety     Arthritis     Hypertension     Inguinal hernia, right     Unilateral inguinal hernia, with obstruction, without gangrene, not specified as recurrent        Past Surgical History:   Procedure Laterality Date    COLONOSCOPY      HERNIA REPAIR      HIP ARTHROPLASTY Right     2011    NH REPAIR ING HERNIA,5+Y/O,REDUCIBL Right 9/28/2016    Procedure: REPAIR HERNIA INGUINALRIGHT  WITH MESH INSERTION;  Surgeon: Anupama Jasmine MD;  Location: [facility] OR;  Service: General    SKIN BIOPSY Left     excision under left chest/rib as a child, noncancerous    VENOUS THROMBECTOMY Left     PERCUT TRANSLUM MECH; REPEAT ON SUBSEQ DAY; LEFT LOWER EXTREMITY DVT; SUCCESSFUL CLEARANCE OF ACUTE THROMBUS IN THE LEFT ILIOFEMORAL SEGMENT USING A COMBINATION OF MECHANICAL THROMBECTOMY AND STENT PLACEMENT; ONSET: [id number]    WISDOM TOOTH EXTRACTION         Family History   Problem Relation Age of Onset    Heart failure Mother     Stroke Mother     Coronary artery disease Father     Stroke Father     Prostate cancer Brother     Stroke Brother        Social History     Socioeconomic History    Marital status: /Civil Union     Spouse name: None    Number of children: None    Years of education: None    Highest education level: None   Occupational History    None   Social Needs    Financial resource strain: None    Food insecurity:     Worry: None     Inability: None    Transportation needs:     Medical: None     Non-medical: None   Tobacco Use    Smoking status: Never Smoker    Smokeless tobacco: Never Used   Substance and Sexual Activity    Alcohol use: Yes     Comment: 1x weekly 1-2 glasses of wine; SOCIAL    Drug use: No    Sexual activity: None   Lifestyle    Physical activity:     Days per week: None     Minutes per session: None    Stress: None   Relationships    Social connections:     Talks on phone: None     Gets together: None     Attends Scientologist service: None     Active member of club or organization: None     Attends meetings of clubs or organizations: None     Relationship status: None    Intimate partner violence:     Fear of current or ex partner: None     Emotionally abused: None     Physically abused: None     Forced sexual activity: None   Other Topics Concern    None   Social History Narrative    Exercising regularly (more than 90 min/week)       Exam:   Vitals:    07/11/19 1335   BP: 160/90   Pulse: 80   Temp: 97 7 °F (36 5 °C)           ______________________________________________________    PHYSICAL EXAM  General Appearance:    Alert, cooperative, no distress, healthy     Head:    Normocephalic without obvious abnormality   Eyes:    PERRL, conjunctiva/corneas clear, EOM's intact        Neck:   Supple, no adenopathy, no JVD   Back:     Symmetric, no spinal or CVA tenderness   Lungs:     Clear to auscultation bilaterally, no wheezing or rhonchi   Heart:    Regular rate and rhythm, S1 and S2 normal, no murmur   Abdomen:     Soft,NTND, +BS   Extremities:   Extremities normal  No clubbing, cyanosis or edema   Psych:   Normal Affect   Neurologic:   CNII-XII intact  Strength symmetric, speech intact                 Malen MAGO Rodriguez  Date: 7/11/2019 Time: 1:56 PM       This report has been generated by a voice recognition software system  Therefore, there may be syntax, spelling, and/or grammatical errors  Please call if you've any questions  This  encounter has been billed by a non certified Coder  Informed consent for procedure was personally discussed, reviewed, and signed by Dr Apple Patrick  Discussion by Dr Apple Patrick was carried out regarding risks, benefits, and alternatives with the patient  Risks include but are not limited to:  bleeding, infection, and delayed wound healing or an open wound, pulmonary embolus, leaks from bowel or bile ducts or other viscus, transfusions, death  Discussed in further detail the more common complications and their rates of occurrence   was used if necessary  Patient expressed understanding of the issues discussed and wished/consented to proceed  All questions were answered by Dr Apple Patrick  Discussion performed between patient and the provider signing below  Signature:   Emily Cruz  Date: 7/11/2019 Time: 1:56 PM                                                                                                                                        Informed consent for procedure was personally discussed, reviewed, and signed by Dr Apple Patrick  Discussion by Dr Apple Patrick was carried out regarding risks, benefits, and alternatives with the patient   Risks include but are not limited to:  bleeding, infection, and delayed wound healing or an open wound, pulmonary embolus, leaks from bowel or bile ducts or other viscus, transfusions, death  Discussed in further detail the more common complications and their rates of occurrence   was used if necessary  Patient expressed understanding of the issues discussed and wished/consented to proceed  All questions were answered by Dr Karen Man  Discussion performed between patient and the provider signing below  Signature:   Claudio Pittman MD    Date: 7/11/2019 Time: 1:56 PM           Some portions of this record may have been generated with voice recognition software  There may be translation, syntax,  or grammatical errors  Occasional wrong word or "sound-a-like" substitutions may have occurred due to the inherent limitations of the voice recognition software  Read the chart carefully and recognize, using context, where substitutions may have occurred  If you have any questions, please contact the dictating provider for clarification or correction, as needed  This encounter has been coded by a non-certified coder

## 2019-07-11 NOTE — PROGRESS NOTES
Assessment/Plan:   Vielka Ocasio  is a 81 y o male who is here for a:     Screening Colonoscopy  Plan: Colonoscopy for: Screening for Colon Cancer    Previous history DVT, on Xeralto    Previous Colonoscopy and  Location of polyps if any:   10 years ago and  with polyps in the :   no polyps or not applicable           Preoperative Clearance: None        ______________________________________________________    HPI:  Vielka Ocasio  is a 81 y o male who was referred for evaluation of    Screening  Currently:     Symptoms include:  no abdominal pain, change in bowel habits, or black or bloody stools  Family history of colon cancer: None reported             Anticoagulation: Xeralto    LABS:      Lab Results   Component Value Date    WBC 6 84 02/12/2017    HGB 11 7 (L) 02/12/2017    HCT 35 4 (L) 02/12/2017    MCV 93 02/12/2017     02/12/2017     Lab Results   Component Value Date    K 3 6 02/12/2017     (H) 02/12/2017    CO2 24 02/12/2017    BUN 11 02/12/2017    CREATININE 0 72 02/12/2017    CALCIUM 7 9 (L) 02/12/2017    EGFR >60 0 02/12/2017     No results found for: HGBA1C  Lab Results   Component Value Date    INR 1 10 02/11/2017    PROTIME 14 3 02/11/2017         No orders to display           ROS:  General ROS: negative for - chills, fatigue, fever or night sweats, weight loss  Respiratory ROS: no cough, shortness of breath, or wheezing  Cardiovascular ROS: no chest pain or dyspnea on exertion  Genito-Urinary ROS: no dysuria, trouble voiding, or hematuria  Musculoskeletal ROS: negative for - gait disturbance, joint pain or muscle pain  Neurological ROS: no TIA or stroke symptoms  GI ROS: see HPI  Skin ROS: no new rashes or lesions   Lymphatic ROS: no new adenopathy noted by pt  GYN ROS: see HPI, no new GYN history or bleeding noted  Psy ROS: no new mental or behavioral disturbances           Imaging: No new pertinent imaging studies           Patient Active Problem List Diagnosis    Deep vein thrombosis (DVT) of iliac vein of left lower extremity (HCC)    Hypercholesterolemia    Anxiety    Osteoarthritis    Hypertension    Rash         Allergies:  Atorvastatin      Current Outpatient Medications:     ALPRAZolam (XANAX) 0 25 mg tablet, Take 1 tablet (0 25 mg total) by mouth 3 (three) times a day as needed for anxiety, Disp: 90 tablet, Rfl: 1    cetirizine (ZYRTEC ALLERGY) 10 mg tablet, Take 1 tablet by mouth daily, Disp: , Rfl:     Cholecalciferol (VITAMIN D3) 2000 units capsule, Take 1 capsule by mouth daily, Disp: , Rfl:     escitalopram (LEXAPRO) 10 mg tablet, Take 0 5 tablets (5 mg total) by mouth daily, Disp: 15 tablet, Rfl: 6    Glucos-Chondroit-Hyaluron-MSM (GLUCOSAMINE CHONDROITIN JOINT PO), Take 1 tablet by mouth daily, Disp: , Rfl:     Magnesium 250 MG TABS, Take 1 tablet by mouth daily, Disp: , Rfl:     mometasone (ELOCON) 0 1 % cream, Apply topically daily, Disp: 45 g, Rfl: 1    Multiple Vitamin (DAILY VALUE MULTIVITAMIN) TABS, Take 1 tablet by mouth daily, Disp: , Rfl:     Saw [place] 450 MG CAPS, Take 2 tablets by mouth daily, Disp: , Rfl:     XARELTO 20 MG tablet, TAKE 1 TABLET BY MOUTH EVERY DAY, Disp: 90 tablet, Rfl: 0    Past Medical History:   Diagnosis Date    Anxiety     Arthritis     Hypertension     Inguinal hernia, right     Unilateral inguinal hernia, with obstruction, without gangrene, not specified as recurrent        Past Surgical History:   Procedure Laterality Date    COLONOSCOPY      HERNIA REPAIR      HIP ARTHROPLASTY Right     2011    WA REPAIR ING HERNIA,5+Y/O,REDUCIBL Right 9/28/2016    Procedure: REPAIR HERNIA INGUINALRIGHT  WITH MESH INSERTION;  Surgeon: Vesna Nails MD;  Location: [facility] OR;  Service: General    SKIN BIOPSY Left     excision under left chest/rib as a child, noncancerous    VENOUS THROMBECTOMY Left     PERCUT TRANSLUM MECH; REPEAT ON SUBSEQ DAY; LEFT LOWER EXTREMITY DVT; SUCCESSFUL CLEARANCE OF ACUTE THROMBUS IN THE LEFT ILIOFEMORAL SEGMENT USING A COMBINATION OF MECHANICAL THROMBECTOMY AND STENT PLACEMENT; ONSET: [id number]    WISDOM TOOTH EXTRACTION         Family History   Problem Relation Age of Onset    Heart failure Mother     Stroke Mother     Coronary artery disease Father     Stroke Father     Prostate cancer Brother     Stroke Brother        Social History     Socioeconomic History    Marital status: /Civil Union     Spouse name: None    Number of children: None    Years of education: None    Highest education level: None   Occupational History    None   Social Needs    Financial resource strain: None    Food insecurity:     Worry: None     Inability: None    Transportation needs:     Medical: None     Non-medical: None   Tobacco Use    Smoking status: Never Smoker    Smokeless tobacco: Never Used   Substance and Sexual Activity    Alcohol use: Yes     Comment: 1x weekly 1-2 glasses of wine; SOCIAL    Drug use: No    Sexual activity: None   Lifestyle    Physical activity:     Days per week: None     Minutes per session: None    Stress: None   Relationships    Social connections:     Talks on phone: None     Gets together: None     Attends L[employer] day service: None     Active member of club or organization: None     Attends meetings of clubs or organizations: None     Relationship status: None    Intimate partner violence:     Fear of current or ex partner: None     Emotionally abused: None     Physically abused: None     Forced sexual activity: None   Other Topics Concern    None   Social History Narrative    Exercising regularly (more than 90 min/week)       Exam:   Vitals:    07/11/19 1335   BP: 160/90   Pulse: 80   Temp: 97 7 °F (36 5 °C)           ______________________________________________________    PHYSICAL EXAM  General Appearance:    Alert, cooperative, no distress, healthy     Head:    Normocephalic without obvious abnormality   Eyes:    PERRL, conjunctiva/corneas clear, EOM's intact        Neck:   Supple, no adenopathy, no JVD   Back:     Symmetric, no spinal or CVA tenderness   Lungs:     Clear to auscultation bilaterally, no wheezing or rhonchi   Heart:    Regular rate and rhythm, S1 and S2 normal, no murmur   Abdomen:     Soft,NTND, +BS   Extremities:   Extremities normal  No clubbing, cyanosis or edema   Psych:   Normal Affect   Neurologic:   CNII-XII intact  Strength symmetric, speech intact                 Sonia Harley PA-C  Date: 7/11/2019 Time: 1:56 PM       This report has been generated by a voice recognition software system  Therefore, there may be syntax, spelling, and/or grammatical errors  Please call if you've any questions  This  encounter has been billed by a non certified Coder  Informed consent for procedure was personally discussed, reviewed, and signed by Dr Sergio Iglesias  Discussion by Dr Sergio Iglesias was carried out regarding risks, benefits, and alternatives with the patient  Risks include but are not limited to:  bleeding, infection, and delayed wound healing or an open wound, pulmonary embolus, leaks from bowel or bile ducts or other viscus, transfusions, death  Discussed in further detail the more common complications and their rates of occurrence   was used if necessary  Patient expressed understanding of the issues discussed and wished/consented to proceed  All questions were answered by Dr Sergio Iglesias  Discussion performed between patient and the provider signing below  Signature:   Vinny Bland  Date: 7/11/2019 Time: 1:56 PM                                                                                                                                        Informed consent for procedure was personally discussed, reviewed, and signed by Dr Sergio Iglesias  Discussion by Dr Sergio Iglesias was carried out regarding risks, benefits, and alternatives with the patient   Risks include but are not limited to:  bleeding, infection, and delayed wound healing or an open wound, pulmonary embolus, leaks from bowel or bile ducts or other viscus, transfusions, death  Discussed in further detail the more common complications and their rates of occurrence   was used if necessary  Patient expressed understanding of the issues discussed and wished/consented to proceed  All questions were answered by Dr Kirk Rao  Discussion performed between patient and the provider signing below  Signature:   Umer Juarez MD    Date: 7/11/2019 Time: 1:56 PM           Some portions of this record may have been generated with voice recognition software  There may be translation, syntax,  or grammatical errors  Occasional wrong word or "sound-a-like" substitutions may have occurred due to the inherent limitations of the voice recognition software  Read the chart carefully and recognize, using context, where substitutions may have occurred  If you have any questions, please contact the dictating provider for clarification or correction, as needed  This encounter has been coded by a non-certified coder

## 2019-07-12 ENCOUNTER — TELEPHONE (OUTPATIENT)
Dept: SURGERY | Facility: CLINIC | Age: 69
End: 2019-07-12

## 2019-07-17 ENCOUNTER — TELEPHONE (OUTPATIENT)
Dept: INTERNAL MEDICINE CLINIC | Facility: CLINIC | Age: 69
End: 2019-07-17

## 2019-07-17 NOTE — TELEPHONE ENCOUNTER
Spoke to Rene Stanley at Dr BRADY ([facility] office to let her know that the patient may hold Xeralto 3 days prior to the colonoscopy

## 2019-07-24 DIAGNOSIS — I82.522 CHRONIC DEEP VEIN THROMBOSIS (DVT) OF ILIAC VEIN OF LEFT LOWER EXTREMITY (HCC): ICD-10-CM

## 2019-07-24 RX ORDER — RIVAROXABAN 20 MG/1
TABLET, FILM COATED ORAL
Qty: 90 TABLET | Refills: 0 | Status: SHIPPED | OUTPATIENT
Start: 2019-07-24 | End: 2019-10-15 | Stop reason: SDUPTHER

## 2019-07-29 ENCOUNTER — ANESTHESIA EVENT (OUTPATIENT)
Dept: GASTROENTEROLOGY | Facility: HOSPITAL | Age: 69
End: 2019-07-29

## 2019-07-30 ENCOUNTER — HOSPITAL ENCOUNTER (OUTPATIENT)
Dept: GASTROENTEROLOGY | Facility: HOSPITAL | Age: 69
Setting detail: OUTPATIENT SURGERY
Discharge: HOME | End: 2019-07-30
Attending: SURGERY | Admitting: SURGERY
Payer: COMMERCIAL

## 2019-07-30 ENCOUNTER — ANESTHESIA (OUTPATIENT)
Dept: GASTROENTEROLOGY | Facility: HOSPITAL | Age: 69
End: 2019-07-30

## 2019-07-30 VITALS
SYSTOLIC BLOOD PRESSURE: 131 MMHG | DIASTOLIC BLOOD PRESSURE: 70 MMHG | OXYGEN SATURATION: 98 % | HEART RATE: 52 BPM | RESPIRATION RATE: 17 BRPM | HEIGHT: 69 IN | TEMPERATURE: 98 F | BODY MASS INDEX: 27.85 KG/M2 | WEIGHT: 188 LBS

## 2019-07-30 DIAGNOSIS — Z12.11 SCREENING FOR COLON CANCER: ICD-10-CM

## 2019-07-30 PROCEDURE — G0121 COLON CA SCRN NOT HI RSK IND: HCPCS | Performed by: SURGERY

## 2019-07-30 RX ORDER — SODIUM CHLORIDE 9 MG/ML
125 INJECTION, SOLUTION INTRAVENOUS CONTINUOUS
Status: DISCONTINUED | OUTPATIENT
Start: 2019-07-30 | End: 2019-08-03 | Stop reason: HOSPADM

## 2019-07-30 RX ORDER — PROPOFOL 10 MG/ML
INJECTION, EMULSION INTRAVENOUS AS NEEDED
Status: DISCONTINUED | OUTPATIENT
Start: 2019-07-30 | End: 2019-07-30 | Stop reason: SURG

## 2019-07-30 RX ADMIN — PROPOFOL 50 MG: 10 INJECTION, EMULSION INTRAVENOUS at 08:32

## 2019-07-30 RX ADMIN — PROPOFOL 50 MG: 10 INJECTION, EMULSION INTRAVENOUS at 08:34

## 2019-07-30 RX ADMIN — SODIUM CHLORIDE 125 ML/HR: 0.9 INJECTION, SOLUTION INTRAVENOUS at 08:10

## 2019-07-30 RX ADMIN — PROPOFOL 50 MG: 10 INJECTION, EMULSION INTRAVENOUS at 08:37

## 2019-07-30 RX ADMIN — PROPOFOL 100 MG: 10 INJECTION, EMULSION INTRAVENOUS at 08:30

## 2019-07-30 NOTE — ANESTHESIA PREPROCEDURE EVALUATION
Review of Systems/Medical History  Patient summary reviewed  Chart reviewed  No history of anesthetic complications     Cardiovascular  Hyperlipidemia, Hypertension ( white coat syndrome  His home BP usually runs in the 120s/70s ) controlled, DVT   Pulmonary  Negative pulmonary ROS        GI/Hepatic    Bowel prep            Endo/Other  Negative endo/other ROS      GYN       Hematology    Coagulation disorder currently taking oral anticoagulants,    Musculoskeletal    Arthritis     Neurology  Negative neurology ROS      Psychology   Anxiety,              Physical Exam    Airway    Mallampati score: III  TM Distance: >3 FB  Neck ROM: full     Dental   No notable dental hx     Cardiovascular  Rhythm: regular, Rate: normal, Cardiovascular exam normal    Pulmonary  Pulmonary exam normal Breath sounds clear to auscultation,     Other Findings        Anesthesia Plan  ASA Score- 2     Anesthesia Type- general with ASA Monitors  Additional Monitors:   Airway Plan:         Plan Factors-Patient not instructed to abstain from smoking on day of procedure  Patient did not smoke on day of surgery  Induction- intravenous  Postoperative Plan-     Informed Consent- Anesthetic plan and risks discussed with patient and spouse Frannie Juan)  I personally reviewed this patient with the CRNA  Discussed and agreed on the Anesthesia Plan with the CRNA  Leon Castanon

## 2019-07-30 NOTE — ANESTHESIA POSTPROCEDURE EVALUATION
Post-Op Assessment Note    CV Status:  Stable    Pain management: adequate     Mental Status:  Alert and awake   Hydration Status:  Euvolemic   PONV Controlled:  Controlled   Airway Patency:  Patent   Post Op Vitals Reviewed: Yes      Staff: Anesthesiologist, CRNA           BP      Temp      Pulse    Resp      SpO2

## 2019-07-30 NOTE — DISCHARGE INSTRUCTIONS
[facility]  Endoscopy Post-Operative Instructions  Dr Shruthi Pedraza MD, FACS    Procedure: Colonoscopy    Findings:  Diverticulosis and Hemorrhoids    Follow-Up: You will need a repeat Endoscopy in (generally)10 years  You should have an endoscopy sooner than recommended if you have any symptoms of bleeding or change in stools or other concerns  You will receive a call from our office with your results, in addition to the the preliminary results you received today  You will usually receive a follow-up letter from our office in 1-2 weeks  Call the office if you do not hear from us  You are welcome to also schedule an office visit if desired to discuss the results further  It is your responsibility to contact our office for results in 1- 2 weeks if you do not hear from us  If a follow up endoscopy is needed, you are responsible for arranging that follow up appointment at the appropriate time  The office may or may not issue a reminder at that future time  Please take responsibility for your own follow up healthcare  Diet: Eat a light snack first, and then resume your previous diet  Call the office if you have unusual fevers, chills, nausea or vomiting or abdominal pain  Report to the emergency room with these are severe in nature  Activity: Do not drive a car, operate machinery, or sign legal documents for 24 hours after your procedure  Normal activity may be resumed on the day following the procedure  Call the office at [phone] for any of the following: Severe abdominal pain, significant rectal bleeding, chills, or fever above 100°, new onset of persistent cough or persistent vomiting  [address], [address]  [facility], [address]  Phone: [phone]        Diverticulosis   WHAT YOU NEED TO KNOW:   Diverticulosis is a condition that causes small pockets called diverticula to form in your intestine  These pockets make it difficult for bowel movements to pass through your digestive system  DISCHARGE INSTRUCTIONS:   Seek care immediately if:   · You have severe pain on the left side of your lower abdomen  · Your bowel movements are bright or dark red  Contact your healthcare provider if:   · You have a fever and chills  · You feel dizzy or lightheaded  · You have nausea, or you are vomiting  · You have a change in your bowel movements  · You have questions or concerns about your condition or care  Medicines:   · Medicines  to soften your bowel movements may be given  You may also need medicines to treat symptoms such as bloating and pain  · Take your medicine as directed  Contact your healthcare provider if you think your medicine is not helping or if you have side effects  Tell him or her if you are allergic to any medicine  Keep a list of the medicines, vitamins, and herbs you take  Include the amounts, and when and why you take them  Bring the list or the pill bottles to follow-up visits  Carry your medicine list with you in case of an emergency  Self-care: The goal of treatment is to manage any symptoms you have and prevent other problems such as diverticulitis  Diverticulitis is swelling or infection of the diverticula  Your healthcare provider may recommend any of the following:  · Eat a variety of high-fiber foods  High-fiber foods help you have regular bowel movements  High-fiber foods include cooked beans, fruits, vegetables, and some cereals  Most adults need 25 to 35 grams of fiber each day  Your healthcare provider may recommend that you have more  Ask your healthcare provider how much fiber you need  Increase fiber slowly  You may have abdominal discomfort, bloating, and gas if you add fiber to your diet too quickly  You may need to take a fiber supplement if you are not getting enough fiber from food  · Drink liquids as directed    You may need to drink 2 to 3 liters (8 to 12 cups) of liquids every day  Ask your healthcare provider how much liquid to drink each day and which liquids are best for you  · Apply heat  on your abdomen for 20 to 30 minutes every 2 hours for as many days as directed  Heat helps decrease pain and muscle spasms  Help prevent diverticulitis or other symptoms: The following may help decrease your risk for diverticulitis or symptoms, such as bleeding  Talk to your provider about these or other things you can do to prevent problems that may occur with diverticulosis  · Exercise regularly  Ask your healthcare provider about the best exercise plan for you  Exercise can help you have regular bowel movements  Get 30 minutes of exercise on most days of the week  · Maintain a healthy weight  Ask your healthcare provider how much you should weigh  Ask him or her to help you create a weight loss plan if you are overweight  · Do not smoke  Nicotine and other chemicals in cigarettes increase your risk for diverticulitis  Ask your healthcare provider for information if you currently smoke and need help to quit  E-cigarettes or smokeless tobacco still contain nicotine  Talk to your healthcare provider before you use these products  · Ask your healthcare provider if it is safe to take NSAIDs  NSAIDs may increase your risk of diverticulitis  Follow up with your healthcare provider as directed:  Write down your questions so you remember to ask them during your visits  © 2017 [employer]  Information is for End User's use only and may not be sold, redistributed or otherwise used for commercial purposes  All illustrations and images included in CareNotes® are the copyrighted property of [employer]  or Vishnu Ayala  The above information is an  only  It is not intended as medical advice for individual conditions or treatments   Talk to your doctor, nurse or pharmacist before following any medical regimen to see if it is safe and effective for you

## 2019-07-31 ENCOUNTER — TELEPHONE (OUTPATIENT)
Dept: SURGERY | Facility: CLINIC | Age: 69
End: 2019-07-31

## 2019-07-31 NOTE — TELEPHONE ENCOUNTER
S/P = Colonoscopy = 07/30/19  Spoke with patient states he feels good  Denies any F/V/N/C he has had a bowel movement  Patient is aware of the 10 year follow up and he is aware to give us a call if he needs us before then  No pathology sent

## 2019-08-07 ENCOUNTER — OFFICE VISIT (OUTPATIENT)
Dept: INTERNAL MEDICINE CLINIC | Facility: CLINIC | Age: 69
End: 2019-08-07
Payer: COMMERCIAL

## 2019-08-07 VITALS
RESPIRATION RATE: 14 BRPM | HEIGHT: 69 IN | DIASTOLIC BLOOD PRESSURE: 90 MMHG | TEMPERATURE: 98.1 F | HEART RATE: 70 BPM | BODY MASS INDEX: 29.18 KG/M2 | WEIGHT: 197 LBS | SYSTOLIC BLOOD PRESSURE: 146 MMHG

## 2019-08-07 DIAGNOSIS — I10 ESSENTIAL HYPERTENSION: ICD-10-CM

## 2019-08-07 DIAGNOSIS — I82.522 CHRONIC DEEP VEIN THROMBOSIS (DVT) OF ILIAC VEIN OF LEFT LOWER EXTREMITY (HCC): Primary | ICD-10-CM

## 2019-08-07 DIAGNOSIS — E78.00 HYPERCHOLESTEROLEMIA: ICD-10-CM

## 2019-08-07 DIAGNOSIS — F41.9 ANXIETY: ICD-10-CM

## 2019-08-07 DIAGNOSIS — R21 RASH: ICD-10-CM

## 2019-08-07 PROCEDURE — 1101F PT FALLS ASSESS-DOCD LE1/YR: CPT | Performed by: INTERNAL MEDICINE

## 2019-08-07 PROCEDURE — 99214 OFFICE O/P EST MOD 30 MIN: CPT | Performed by: INTERNAL MEDICINE

## 2019-08-07 PROCEDURE — 1160F RVW MEDS BY RX/DR IN RCRD: CPT | Performed by: INTERNAL MEDICINE

## 2019-08-07 PROCEDURE — 3008F BODY MASS INDEX DOCD: CPT | Performed by: INTERNAL MEDICINE

## 2019-08-07 RX ORDER — MOMETASONE FUROATE 1 MG/G
CREAM TOPICAL DAILY
Qty: 45 G | Refills: 1 | Status: SHIPPED | OUTPATIENT
Start: 2019-08-07 | End: 2019-10-05 | Stop reason: SDUPTHER

## 2019-08-07 RX ORDER — ALPRAZOLAM 0.25 MG/1
0.25 TABLET ORAL 3 TIMES DAILY PRN
Qty: 90 TABLET | Refills: 1 | Status: SHIPPED | OUTPATIENT
Start: 2019-08-07 | End: 2020-03-02 | Stop reason: SDUPTHER

## 2019-08-08 NOTE — PROGRESS NOTES
[facility] [place]      NAME: Kiel Feliciano  AGE: 81 y o  SEX: male  : 1950   MRN: [id number]    DATE: 2019  TIME: 3:22 PM    Assessment and Plan   1  Anxiety  Well controlled on alprazolam as needed  - ALPRAZolam (XANAX) 0 25 mg tablet; Take 1 tablet (0 25 mg total) by mouth 3 (three) times a day as needed for anxiety  Dispense: 90 tablet; Refill: 1    2  Rash  Stable on mometasone as necessary   - mometasone (ELOCON) 0 1 % cream; Apply topically daily  Dispense: 45 g; Refill: 1    3  Chronic deep vein thrombosis (DVT) of iliac vein of left lower extremity (HCC)  On chronic anticoagulation with Xarelto  4  Essential hypertension  Well controlled  The patient monitors this at home and has been generally 120 or below  It is always higher in the office  5  Hypercholesterolemia  Currently on diet therapy  The patient is doing generally well  He will maintain his current regimen  He is hoping to move to [place] in the spring  Return to office in:  Four or 5 months  Chief Complaint     Chief Complaint   Patient presents with    Follow-up     History of Present Illness     The patient returned to the office for re-evaluation of hypertension with a significant white coat component, hypercholesterolemia, deep vein thrombosis, and osteoarthritis  Since his last visit he has been feeling generally well  He has had no chest pain, shortness of breath, palpitations, or dizziness  He has had no leg pain or swelling  He is tolerating his medications well  He had a colonoscopy last month  The following portions of the patient's history were reviewed and updated as appropriate: allergies, current medications, past family history, past medical history, past social history, past surgical history and problem list     Review of Systems   Review of Systems   Constitutional: Negative      HENT: Negative for congestion, ear pain, postnasal drip, rhinorrhea, sore throat and trouble swallowing  Eyes: Negative for pain, discharge, redness and visual disturbance  Respiratory: Negative for cough, shortness of breath and wheezing  Cardiovascular: Negative  Gastrointestinal: Negative  Endocrine: Negative  Genitourinary: Negative for difficulty urinating, dysuria, frequency, hematuria and urgency  Musculoskeletal: Negative for arthralgias, gait problem, joint swelling and myalgias  Skin: Negative for rash  Neurological: Negative for dizziness, speech difficulty, weakness, light-headedness, numbness and headaches  Hematological: Negative  Psychiatric/Behavioral: Negative for confusion, decreased concentration, dysphoric mood and sleep disturbance  The patient is nervous/anxious  Active Problem List     Patient Active Problem List   Diagnosis    Deep vein thrombosis (DVT) of iliac vein of left lower extremity (HCC)    Hypercholesterolemia    Anxiety    Osteoarthritis    Hypertension    Rash       Objective   /90 (BP Location: Left arm, Patient Position: Sitting, Cuff Size: Standard)   Pulse 70   Temp 98 1 °F (36 7 °C)   Resp 14   Ht 5' 9" (1 753 m)   Wt 89 4 kg (197 lb)   BMI 29 09 kg/m²     Physical Exam   Constitutional: He is oriented to person, place, and time  He appears well-developed and well-nourished  No distress  HENT:   Head: Normocephalic and atraumatic  Neck: Neck supple  No JVD present  No tracheal deviation present  No thyromegaly present  Cardiovascular: Normal rate, regular rhythm, normal heart sounds and intact distal pulses  Exam reveals no gallop and no friction rub  No murmur heard  Pulmonary/Chest: Effort normal and breath sounds normal  He has no wheezes  He has no rales  He exhibits no tenderness  Abdominal: Soft  Bowel sounds are normal  He exhibits no distension and no mass  There is no tenderness  There is no rebound  Musculoskeletal: Normal range of motion  He exhibits no edema or tenderness  Lymphadenopathy:     He has no cervical adenopathy  Neurological: He is alert and oriented to person, place, and time  Skin: Skin is warm and dry  Psychiatric: He has a normal mood and affect  His behavior is normal  Judgment and thought content normal        Pertinent Laboratory/Diagnostic Studies:  No visits with results within 3 Month(s) from this visit     Latest known visit with results is:   Admission on 02/10/2017, Discharged on 02/12/2017   Component Date Value Ref Range Status    Fibrinogen 02/10/2017 359  227 - 495 mg/dL Final    PTT 02/10/2017 76* 24 - 36 seconds Final    Fibrinogen 02/10/2017 334  227 - 495 mg/dL Final    WBC 02/10/2017 7 32  4 31 - 10 16 Thousand/uL Final    RBC 02/10/2017 4 20  3 88 - 5 62 Million/uL Final    Hemoglobin 02/10/2017 13 1  12 0 - 17 0 g/dL Final    Hematocrit 02/10/2017 39 0  36 5 - 49 3 % Final    MCV 02/10/2017 93  82 - 98 fL Final    MCH 02/10/2017 31 2  26 8 - 34 3 pg Final    MCHC 02/10/2017 33 6  31 4 - 37 4 g/dL Final    RDW 02/10/2017 13 8  11 6 - 15 1 % Final    Platelets 24/80/1124 205  149 - 390 Thousands/uL Final    MPV 02/10/2017 10 4  8 9 - 12 7 fL Final    Fibrinogen 02/11/2017 321  227 - 495 mg/dL Final    PTT 02/11/2017 33  24 - 36 seconds Final    WBC 02/11/2017 8 40  4 31 - 10 16 Thousand/uL Final    RBC 02/11/2017 4 41  3 88 - 5 62 Million/uL Final    Hemoglobin 02/11/2017 13 5  12 0 - 17 0 g/dL Final    Hematocrit 02/11/2017 40 8  36 5 - 49 3 % Final    MCV 02/11/2017 93  82 - 98 fL Final    MCH 02/11/2017 30 6  26 8 - 34 3 pg Final    MCHC 02/11/2017 33 1  31 4 - 37 4 g/dL Final    RDW 02/11/2017 13 7  11 6 - 15 1 % Final    Platelets 09/12/1109 201  149 - 390 Thousands/uL Final    MPV 02/11/2017 10 0  8 9 - 12 7 fL Final    Sodium 02/11/2017 143  136 - 145 mmol/L Final    Potassium 02/11/2017 3 4* 3 5 - 5 3 mmol/L Final    Chloride 02/11/2017 108  100 - 108 mmol/L Final    CO2 02/11/2017 24  21 - 32 mmol/L Final  ANION GAP 02/11/2017 11  4 - 13 mmol/L Final    BUN 02/11/2017 13  5 - 25 mg/dL Final    Creatinine 02/11/2017 0 82  0 60 - 1 30 mg/dL Final    Glucose 02/11/2017 104  65 - 140 mg/dL Final    Calcium 02/11/2017 8 4  8 3 - 10 1 mg/dL Final    eGFR 02/11/2017 >60 0  ml/min/1 73sq m Final    Fibrinogen 02/11/2017 189* 227 - 495 mg/dL Final    PTT 02/11/2017 38* 24 - 36 seconds Final    WBC 02/11/2017 7 05  4 31 - 10 16 Thousand/uL Final    RBC 02/11/2017 4 36  3 88 - 5 62 Million/uL Final    Hemoglobin 02/11/2017 13 3  12 0 - 17 0 g/dL Final    Hematocrit 02/11/2017 40 4  36 5 - 49 3 % Final    MCV 02/11/2017 93  82 - 98 fL Final    MCH 02/11/2017 30 5  26 8 - 34 3 pg Final    MCHC 02/11/2017 32 9  31 4 - 37 4 g/dL Final    RDW 02/11/2017 13 9  11 6 - 15 1 % Final    Platelets 37/96/0903 188  149 - 390 Thousands/uL Final    MPV 02/11/2017 9 9  8 9 - 12 7 fL Final    PTT 02/11/2017 89* 24 - 36 seconds Final    Protime 02/11/2017 14 3  12 0 - 14 3 seconds Final    INR 02/11/2017 1 10  0 86 - 1 16 Final    WBC 02/12/2017 6 84  4 31 - 10 16 Thousand/uL Final    RBC 02/12/2017 3 82* 3 88 - 5 62 Million/uL Final    Hemoglobin 02/12/2017 11 7* 12 0 - 17 0 g/dL Final    Hematocrit 02/12/2017 35 4* 36 5 - 49 3 % Final    MCV 02/12/2017 93  82 - 98 fL Final    MCH 02/12/2017 30 6  26 8 - 34 3 pg Final    MCHC 02/12/2017 33 1  31 4 - 37 4 g/dL Final    RDW 02/12/2017 14 0  11 6 - 15 1 % Final    Platelets 52/14/8608 172  149 - 390 Thousands/uL Final    MPV 02/12/2017 10 4  8 9 - 12 7 fL Final    Sodium 02/12/2017 142  136 - 145 mmol/L Final    Potassium 02/12/2017 3 6  3 5 - 5 3 mmol/L Final    Chloride 02/12/2017 109* 100 - 108 mmol/L Final    CO2 02/12/2017 24  21 - 32 mmol/L Final    ANION GAP 02/12/2017 9  4 - 13 mmol/L Final    BUN 02/12/2017 11  5 - 25 mg/dL Final    Creatinine 02/12/2017 0 72  0 60 - 1 30 mg/dL Final    Glucose 02/12/2017 100  65 - 140 mg/dL Final    Calcium 02/12/2017 7 9* 8 3 - 10 1 mg/dL Final    eGFR 02/12/2017 >60 0  ml/min/1 73sq m Final    PTT 02/12/2017 97* 24 - 36 seconds Final    PTT 02/12/2017 71* 24 - 36 seconds Final           Current Medications     Current Outpatient Medications:     ALPRAZolam (XANAX) 0 25 mg tablet, Take 1 tablet (0 25 mg total) by mouth 3 (three) times a day as needed for anxiety, Disp: 90 tablet, Rfl: 1    cetirizine (ZYRTEC ALLERGY) 10 mg tablet, Take 1 tablet by mouth daily, Disp: , Rfl:     Cholecalciferol (VITAMIN D3) 2000 units capsule, Take 1 capsule by mouth daily, Disp: , Rfl:     escitalopram (LEXAPRO) 10 mg tablet, Take 0 5 tablets (5 mg total) by mouth daily, Disp: 15 tablet, Rfl: 6    Glucos-Chondroit-Hyaluron-MSM (GLUCOSAMINE CHONDROITIN JOINT PO), Take 1 tablet by mouth daily, Disp: , Rfl:     Magnesium 250 MG TABS, Take 1 tablet by mouth daily, Disp: , Rfl:     mometasone (ELOCON) 0 1 % cream, Apply topically daily, Disp: 45 g, Rfl: 1    Multiple Vitamin (DAILY VALUE MULTIVITAMIN) TABS, Take 1 tablet by mouth daily, Disp: , Rfl:     Multiple Vitamins-Minerals (ICAPS AREDS 2 PO), Take 1 each by mouth daily, Disp: , Rfl:     Saw [place] 450 MG CAPS, Take 2 tablets by mouth daily, Disp: , Rfl:     Turmeric 1053 MG TABS, Take 1 each by mouth daily, Disp: , Rfl:     XARELTO 20 MG tablet, TAKE 1 TABLET BY MOUTH EVERY DAY, Disp: 90 tablet, Rfl: 0      Rafita Dozier MD

## 2019-08-31 DIAGNOSIS — F32.A DEPRESSION, UNSPECIFIED DEPRESSION TYPE: ICD-10-CM

## 2019-09-03 RX ORDER — ESCITALOPRAM OXALATE 10 MG/1
TABLET ORAL
Qty: 15 TABLET | Refills: 6 | Status: SHIPPED | OUTPATIENT
Start: 2019-09-03 | End: 2020-01-28 | Stop reason: DRUGHIGH

## 2019-10-05 DIAGNOSIS — R21 RASH: ICD-10-CM

## 2019-10-08 RX ORDER — MOMETASONE FUROATE 1 MG/G
CREAM TOPICAL
Qty: 45 G | Refills: 1 | Status: SHIPPED | OUTPATIENT
Start: 2019-10-08 | End: 2019-12-14 | Stop reason: SDUPTHER

## 2019-10-15 DIAGNOSIS — I82.522 CHRONIC DEEP VEIN THROMBOSIS (DVT) OF ILIAC VEIN OF LEFT LOWER EXTREMITY (HCC): ICD-10-CM

## 2019-10-15 RX ORDER — RIVAROXABAN 20 MG/1
TABLET, FILM COATED ORAL
Qty: 90 TABLET | Refills: 3 | Status: SHIPPED | OUTPATIENT
Start: 2019-10-15 | End: 2020-07-29

## 2019-12-14 DIAGNOSIS — R21 RASH: ICD-10-CM

## 2019-12-17 RX ORDER — MOMETASONE FUROATE 1 MG/G
CREAM TOPICAL
Qty: 45 G | Refills: 1 | Status: SHIPPED | OUTPATIENT
Start: 2019-12-17 | End: 2020-07-28 | Stop reason: SDUPTHER

## 2020-01-28 ENCOUNTER — OFFICE VISIT (OUTPATIENT)
Dept: INTERNAL MEDICINE CLINIC | Facility: CLINIC | Age: 70
End: 2020-01-28
Payer: COMMERCIAL

## 2020-01-28 VITALS
WEIGHT: 205.2 LBS | OXYGEN SATURATION: 97 % | BODY MASS INDEX: 30.39 KG/M2 | TEMPERATURE: 97.9 F | DIASTOLIC BLOOD PRESSURE: 90 MMHG | SYSTOLIC BLOOD PRESSURE: 152 MMHG | HEIGHT: 69 IN | HEART RATE: 60 BPM

## 2020-01-28 DIAGNOSIS — F41.9 ANXIETY: ICD-10-CM

## 2020-01-28 DIAGNOSIS — Z12.5 SCREENING FOR PROSTATE CANCER: ICD-10-CM

## 2020-01-28 DIAGNOSIS — F32.A DEPRESSION, UNSPECIFIED DEPRESSION TYPE: ICD-10-CM

## 2020-01-28 DIAGNOSIS — I82.522 CHRONIC DEEP VEIN THROMBOSIS (DVT) OF ILIAC VEIN OF LEFT LOWER EXTREMITY (HCC): ICD-10-CM

## 2020-01-28 DIAGNOSIS — I10 ESSENTIAL HYPERTENSION: Primary | ICD-10-CM

## 2020-01-28 DIAGNOSIS — E78.00 HYPERCHOLESTEROLEMIA: ICD-10-CM

## 2020-01-28 DIAGNOSIS — Z11.59 NEED FOR HEPATITIS C SCREENING TEST: ICD-10-CM

## 2020-01-28 PROCEDURE — 99214 OFFICE O/P EST MOD 30 MIN: CPT | Performed by: INTERNAL MEDICINE

## 2020-01-28 PROCEDURE — 1160F RVW MEDS BY RX/DR IN RCRD: CPT | Performed by: INTERNAL MEDICINE

## 2020-01-28 PROCEDURE — 3008F BODY MASS INDEX DOCD: CPT | Performed by: INTERNAL MEDICINE

## 2020-01-28 PROCEDURE — 1036F TOBACCO NON-USER: CPT | Performed by: INTERNAL MEDICINE

## 2020-01-28 RX ORDER — ESCITALOPRAM OXALATE 5 MG/1
5 TABLET ORAL DAILY
Qty: 90 TABLET | Refills: 3 | Status: CANCELLED | OUTPATIENT
Start: 2020-01-28

## 2020-01-28 RX ORDER — ESCITALOPRAM OXALATE 10 MG/1
5 TABLET ORAL DAILY
Qty: 15 TABLET | Refills: 6 | Status: CANCELLED | OUTPATIENT
Start: 2020-01-28

## 2020-01-28 RX ORDER — ALPRAZOLAM 0.25 MG/1
0.25 TABLET ORAL 3 TIMES DAILY PRN
Qty: 90 TABLET | Refills: 1 | Status: CANCELLED | OUTPATIENT
Start: 2020-01-28

## 2020-01-28 NOTE — PROGRESS NOTES
[facility] [place]      NAME: Truong Aguirre  AGE: 81 y o  SEX: male  : 1950   MRN: [id number]    DATE: 2020  TIME: 10:21 AM    Assessment and Plan   1  Essential hypertension  This was somewhat elevated this morning but has been well controlled at home  2  Chronic deep vein thrombosis (DVT) of iliac vein of left lower extremity (HCC)  Tolerating Xarelto  This will be continued long term  - rivaroxaban (XARELTO) 20 mg tablet; Take 1 tablet (20 mg total) by mouth daily  Dispense: 90 tablet; Refill: 3    3  Hypercholesterolemia  Improved on diet  4  Anxiety  Stable on low-dose alprazolam   - ALPRAZolam (XANAX) 0 25 mg tablet; Take 1 tablet (0 25 mg total) by mouth 3 (three) times a day as needed for anxiety  Dispense: 90 tablet; Refill: 1    5  Depression, unspecified depression type  Stable on low-dose Lexapro  6  Mild obesity  BMI Counseling: Body mass index is 30 3 kg/m²  The BMI is above normal  Nutrition recommendations include reducing portion sizes and 3-5 servings of fruits/vegetables daily  Exercise recommendations include moderate aerobic physical activity for 150 minutes/week  The patient is doing well  He will continue with his current medications  At some point in the future he will be moving to [place]  I advised him to continue his current medications for now  If he is still in the area and 6 months he will return for follow-up with updated lab work  Return to office in:  6 months    Chief Complaint     Chief Complaint   Patient presents with    Follow-up     5 month follow up        History of Present Illness     The patient returned to the office for re-evaluation of hypertension, hypercholesterolemia, previous extensive DVT, depression, and anxiety  Since his last visit he has been feeling rather well  He occasionally has some mild pain in both groins  He is soon to that this is related to his previous hip surgery and stent placement  This has not been persistent or severe  He has had no change in his leg swelling  He has had no chest pain, shortness of breath, palpitations, or dizziness  He and his wife for in the process of closing their apartment in [address] [address] to relocate to [place], [place]  In the relatively near future, they intend to moved to [place]  The following portions of the patient's history were reviewed and updated as appropriate: allergies, current medications, past family history, past medical history, past social history, past surgical history and problem list     Review of Systems   Review of Systems   Constitutional: Negative  HENT: Negative for congestion, ear pain, postnasal drip, rhinorrhea, sore throat and trouble swallowing  Eyes: Negative for pain, discharge, redness and visual disturbance  Respiratory: Negative for cough, shortness of breath and wheezing  Cardiovascular: Negative  Gastrointestinal: Negative  Endocrine: Negative  Genitourinary: Negative for difficulty urinating, dysuria, frequency, hematuria and urgency  Musculoskeletal: Negative for arthralgias, gait problem, joint swelling and myalgias  Skin: Negative for rash  Neurological: Negative for dizziness, speech difficulty, weakness, light-headedness, numbness and headaches  Hematological: Negative  Psychiatric/Behavioral: Negative for confusion, decreased concentration, dysphoric mood and sleep disturbance  The patient is not nervous/anxious          Active Problem List     Patient Active Problem List   Diagnosis    Deep vein thrombosis (DVT) of iliac vein of left lower extremity (HCC)    Hypercholesterolemia    Anxiety    Osteoarthritis    Hypertension    Rash       Objective   /90 (BP Location: Left arm, Patient Position: Sitting, Cuff Size: Large)   Pulse 60   Temp 97 9 °F (36 6 °C) (Tympanic)   Ht 5' 9" (1 753 m)   Wt 93 1 kg (205 lb 3 2 oz)   SpO2 97%   BMI 30 30 kg/m²     Physical Exam   Constitutional: He is oriented to person, place, and time  He appears well-developed and well-nourished  No distress  HENT:   Head: Normocephalic and atraumatic  Neck: Neck supple  No JVD present  No tracheal deviation present  No thyromegaly present  Cardiovascular: Normal rate, regular rhythm, normal heart sounds and intact distal pulses  Exam reveals no gallop and no friction rub  No murmur heard  Pulmonary/Chest: Effort normal and breath sounds normal  He has no wheezes  He has no rales  He exhibits no tenderness  Abdominal: Soft  Bowel sounds are normal  He exhibits no distension and no mass  There is no tenderness  There is no rebound  Musculoskeletal: Normal range of motion  He exhibits no edema or tenderness  Lymphadenopathy:     He has no cervical adenopathy  Neurological: He is alert and oriented to person, place, and time  Skin: Skin is warm and dry  Psychiatric: He has a normal mood and affect  His behavior is normal  Judgment and thought content normal        Pertinent Laboratory/Diagnostic Studies:  No visits with results within 3 Month(s) from this visit     Latest known visit with results is:   Admission on 02/10/2017, Discharged on 02/12/2017   Component Date Value Ref Range Status    Fibrinogen 02/10/2017 359  227 - 495 mg/dL Final    PTT 02/10/2017 76* 24 - 36 seconds Final    Fibrinogen 02/10/2017 334  227 - 495 mg/dL Final    WBC 02/10/2017 7 32  4 31 - 10 16 Thousand/uL Final    RBC 02/10/2017 4 20  3 88 - 5 62 Million/uL Final    Hemoglobin 02/10/2017 13 1  12 0 - 17 0 g/dL Final    Hematocrit 02/10/2017 39 0  36 5 - 49 3 % Final    MCV 02/10/2017 93  82 - 98 fL Final    MCH 02/10/2017 31 2  26 8 - 34 3 pg Final    MCHC 02/10/2017 33 6  31 4 - 37 4 g/dL Final    RDW 02/10/2017 13 8  11 6 - 15 1 % Final    Platelets 43/47/2178 205  149 - 390 Thousands/uL Final    MPV 02/10/2017 10 4  8 9 - 12 7 fL Final    Fibrinogen 02/11/2017 321  227 - 495 mg/dL Final  PTT 02/11/2017 33  24 - 36 seconds Final    WBC 02/11/2017 8 40  4 31 - 10 16 Thousand/uL Final    RBC 02/11/2017 4 41  3 88 - 5 62 Million/uL Final    Hemoglobin 02/11/2017 13 5  12 0 - 17 0 g/dL Final    Hematocrit 02/11/2017 40 8  36 5 - 49 3 % Final    MCV 02/11/2017 93  82 - 98 fL Final    MCH 02/11/2017 30 6  26 8 - 34 3 pg Final    MCHC 02/11/2017 33 1  31 4 - 37 4 g/dL Final    RDW 02/11/2017 13 7  11 6 - 15 1 % Final    Platelets 42/48/2604 201  149 - 390 Thousands/uL Final    MPV 02/11/2017 10 0  8 9 - 12 7 fL Final    Sodium 02/11/2017 143  136 - 145 mmol/L Final    Potassium 02/11/2017 3 4* 3 5 - 5 3 mmol/L Final    Chloride 02/11/2017 108  100 - 108 mmol/L Final    CO2 02/11/2017 24  21 - 32 mmol/L Final    ANION GAP 02/11/2017 11  4 - 13 mmol/L Final    BUN 02/11/2017 13  5 - 25 mg/dL Final    Creatinine 02/11/2017 0 82  0 60 - 1 30 mg/dL Final    Glucose 02/11/2017 104  65 - 140 mg/dL Final    Calcium 02/11/2017 8 4  8 3 - 10 1 mg/dL Final    eGFR 02/11/2017 >60 0  ml/min/1 73sq m Final    Fibrinogen 02/11/2017 189* 227 - 495 mg/dL Final    PTT 02/11/2017 38* 24 - 36 seconds Final    WBC 02/11/2017 7 05  4 31 - 10 16 Thousand/uL Final    RBC 02/11/2017 4 36  3 88 - 5 62 Million/uL Final    Hemoglobin 02/11/2017 13 3  12 0 - 17 0 g/dL Final    Hematocrit 02/11/2017 40 4  36 5 - 49 3 % Final    MCV 02/11/2017 93  82 - 98 fL Final    MCH 02/11/2017 30 5  26 8 - 34 3 pg Final    MCHC 02/11/2017 32 9  31 4 - 37 4 g/dL Final    RDW 02/11/2017 13 9  11 6 - 15 1 % Final    Platelets 24/03/4887 188  149 - 390 Thousands/uL Final    MPV 02/11/2017 9 9  8 9 - 12 7 fL Final    PTT 02/11/2017 89* 24 - 36 seconds Final    Protime 02/11/2017 14 3  12 0 - 14 3 seconds Final    INR 02/11/2017 1 10  0 86 - 1 16 Final    WBC 02/12/2017 6 84  4 31 - 10 16 Thousand/uL Final    RBC 02/12/2017 3 82* 3 88 - 5 62 Million/uL Final    Hemoglobin 02/12/2017 11 7* 12 0 - 17 0 g/dL Final  Hematocrit 02/12/2017 35 4* 36 5 - 49 3 % Final    MCV 02/12/2017 93  82 - 98 fL Final    MCH 02/12/2017 30 6  26 8 - 34 3 pg Final    MCHC 02/12/2017 33 1  31 4 - 37 4 g/dL Final    RDW 02/12/2017 14 0  11 6 - 15 1 % Final    Platelets 33/79/4974 172  149 - 390 Thousands/uL Final    MPV 02/12/2017 10 4  8 9 - 12 7 fL Final    Sodium 02/12/2017 142  136 - 145 mmol/L Final    Potassium 02/12/2017 3 6  3 5 - 5 3 mmol/L Final    Chloride 02/12/2017 109* 100 - 108 mmol/L Final    CO2 02/12/2017 24  21 - 32 mmol/L Final    ANION GAP 02/12/2017 9  4 - 13 mmol/L Final    BUN 02/12/2017 11  5 - 25 mg/dL Final    Creatinine 02/12/2017 0 72  0 60 - 1 30 mg/dL Final    Glucose 02/12/2017 100  65 - 140 mg/dL Final    Calcium 02/12/2017 7 9* 8 3 - 10 1 mg/dL Final    eGFR 02/12/2017 >60 0  ml/min/1 73sq m Final    PTT 02/12/2017 97* 24 - 36 seconds Final    PTT 02/12/2017 71* 24 - 36 seconds Final           Current Medications     Current Outpatient Medications:     ALPRAZolam (XANAX) 0 25 mg tablet, Take 1 tablet (0 25 mg total) by mouth 3 (three) times a day as needed for anxiety, Disp: 90 tablet, Rfl: 1    cetirizine (ZYRTEC ALLERGY) 10 mg tablet, Take 1 tablet by mouth daily, Disp: , Rfl:     Cholecalciferol (VITAMIN D3) 2000 units capsule, Take 1 capsule by mouth daily, Disp: , Rfl:     Glucos-Chondroit-Hyaluron-MSM (GLUCOSAMINE CHONDROITIN JOINT PO), Take 1 tablet by mouth daily, Disp: , Rfl:     Magnesium 250 MG TABS, Take 1 tablet by mouth daily, Disp: , Rfl:     mometasone (ELOCON) 0 1 % cream, APPLY TOPICALLY ONCE DAILY, Disp: 45 g, Rfl: 1    Multiple Vitamin (DAILY VALUE MULTIVITAMIN) TABS, Take 1 tablet by mouth daily, Disp: , Rfl:     Multiple Vitamins-Minerals (ICAPS AREDS 2 PO), Take 1 each by mouth daily, Disp: , Rfl:     Saw [place] 450 MG CAPS, Take 2 tablets by mouth daily, Disp: , Rfl:     Turmeric 1053 MG TABS, Take 1 each by mouth daily, Disp: , Rfl:     XARELTO 20 MG tablet, TAKE 1 TABLET BY MOUTH EVERY DAY, Disp: 90 tablet, Rfl: 3      Michelle Sanchez MD

## 2020-03-02 DIAGNOSIS — F41.9 ANXIETY: ICD-10-CM

## 2020-03-04 RX ORDER — ALPRAZOLAM 0.25 MG/1
0.25 TABLET ORAL 3 TIMES DAILY PRN
Qty: 90 TABLET | Refills: 1 | Status: SHIPPED | OUTPATIENT
Start: 2020-03-04 | End: 2020-09-15 | Stop reason: SDUPTHER

## 2020-03-04 RX ORDER — ESCITALOPRAM OXALATE 10 MG/1
5 TABLET ORAL DAILY
Qty: 90 TABLET | Refills: 1 | Status: SHIPPED | OUTPATIENT
Start: 2020-03-04 | End: 2020-03-09 | Stop reason: SDUPTHER

## 2020-03-09 DIAGNOSIS — F41.9 ANXIETY: ICD-10-CM

## 2020-03-09 RX ORDER — ESCITALOPRAM OXALATE 10 MG/1
5 TABLET ORAL DAILY
Qty: 7 TABLET | Refills: 0 | Status: SHIPPED | OUTPATIENT
Start: 2020-03-09 | End: 2021-01-26 | Stop reason: SDUPTHER

## 2020-03-12 ENCOUNTER — TELEPHONE (OUTPATIENT)
Dept: INTERNAL MEDICINE CLINIC | Facility: CLINIC | Age: 70
End: 2020-03-12

## 2020-03-12 NOTE — TELEPHONE ENCOUNTER
Gentry Matthew, from [facility] called to state that they do not have the Oval pill of the Lexapro that the patient is requesting  They only have the round pill  Asking if this is okay to dispense? Called patient to inform him of the above    He states that it is okay for them to send the Round pill of the Lexapro and that he should be able to cut it in half with his pill cutter    Called Axel back with the okay to send out the Lexapro prescription with the round pill

## 2020-07-15 LAB
ALBUMIN SERPL-MCNC: 4.5 G/DL (ref 3.8–4.8)
ALBUMIN/GLOB SERPL: 1.6 {RATIO} (ref 1.2–2.2)
ALP SERPL-CCNC: 50 IU/L (ref 39–117)
ALT SERPL-CCNC: 29 IU/L (ref 0–44)
AST SERPL-CCNC: 24 IU/L (ref 0–40)
BASOPHILS # BLD AUTO: 0 X10E3/UL (ref 0–0.2)
BASOPHILS NFR BLD AUTO: 0 %
BILIRUB SERPL-MCNC: 0.6 MG/DL (ref 0–1.2)
BUN SERPL-MCNC: 13 MG/DL (ref 8–27)
BUN/CREAT SERPL: 13 (ref 10–24)
CALCIUM SERPL-MCNC: 9.5 MG/DL (ref 8.6–10.2)
CHLORIDE SERPL-SCNC: 101 MMOL/L (ref 96–106)
CHOLEST SERPL-MCNC: 234 MG/DL (ref 100–199)
CO2 SERPL-SCNC: 23 MMOL/L (ref 20–29)
CREAT SERPL-MCNC: 0.99 MG/DL (ref 0.76–1.27)
EOSINOPHIL # BLD AUTO: 0.3 X10E3/UL (ref 0–0.4)
EOSINOPHIL NFR BLD AUTO: 6 %
ERYTHROCYTE [DISTWIDTH] IN BLOOD BY AUTOMATED COUNT: 13.2 % (ref 11.6–15.4)
GLOBULIN SER-MCNC: 2.8 G/DL (ref 1.5–4.5)
GLUCOSE SERPL-MCNC: 106 MG/DL (ref 65–99)
HCT VFR BLD AUTO: 46.6 % (ref 37.5–51)
HCV AB S/CO SERPL IA: <0.1 S/CO RATIO (ref 0–0.9)
HDLC SERPL-MCNC: 38 MG/DL
HGB BLD-MCNC: 15.7 G/DL (ref 13–17.7)
IMM GRANULOCYTES # BLD: 0 X10E3/UL (ref 0–0.1)
IMM GRANULOCYTES NFR BLD: 0 %
LDLC SERPL CALC-MCNC: 149 MG/DL (ref 0–99)
LYMPHOCYTES # BLD AUTO: 2.1 X10E3/UL (ref 0.7–3.1)
LYMPHOCYTES NFR BLD AUTO: 41 %
MCH RBC QN AUTO: 31.7 PG (ref 26.6–33)
MCHC RBC AUTO-ENTMCNC: 33.7 G/DL (ref 31.5–35.7)
MCV RBC AUTO: 94 FL (ref 79–97)
MONOCYTES # BLD AUTO: 0.5 X10E3/UL (ref 0.1–0.9)
MONOCYTES NFR BLD AUTO: 9 %
NEUTROPHILS # BLD AUTO: 2.2 X10E3/UL (ref 1.4–7)
NEUTROPHILS NFR BLD AUTO: 44 %
PLATELET # BLD AUTO: 201 X10E3/UL (ref 150–450)
POTASSIUM SERPL-SCNC: 4.9 MMOL/L (ref 3.5–5.2)
PROT SERPL-MCNC: 7.3 G/DL (ref 6–8.5)
PSA SERPL-MCNC: 2.9 NG/ML (ref 0–4)
RBC # BLD AUTO: 4.95 X10E6/UL (ref 4.14–5.8)
SL AMB EGFR AFRICAN AMERICAN: 90 ML/MIN/1.73
SL AMB EGFR NON AFRICAN AMERICAN: 78 ML/MIN/1.73
SL AMB VLDL CHOLESTEROL CALC: 47 MG/DL (ref 5–40)
SODIUM SERPL-SCNC: 139 MMOL/L (ref 134–144)
TRIGL SERPL-MCNC: 237 MG/DL (ref 0–149)
WBC # BLD AUTO: 5 X10E3/UL (ref 3.4–10.8)

## 2020-07-28 ENCOUNTER — OFFICE VISIT (OUTPATIENT)
Dept: INTERNAL MEDICINE CLINIC | Facility: CLINIC | Age: 70
End: 2020-07-28
Payer: MEDICARE

## 2020-07-28 VITALS
BODY MASS INDEX: 28.05 KG/M2 | HEIGHT: 69 IN | OXYGEN SATURATION: 97 % | HEART RATE: 62 BPM | SYSTOLIC BLOOD PRESSURE: 142 MMHG | WEIGHT: 189.4 LBS | DIASTOLIC BLOOD PRESSURE: 80 MMHG | TEMPERATURE: 98 F

## 2020-07-28 DIAGNOSIS — E78.00 HYPERCHOLESTEROLEMIA: ICD-10-CM

## 2020-07-28 DIAGNOSIS — I10 ESSENTIAL HYPERTENSION: ICD-10-CM

## 2020-07-28 DIAGNOSIS — R21 RASH: ICD-10-CM

## 2020-07-28 DIAGNOSIS — I82.522 CHRONIC DEEP VEIN THROMBOSIS (DVT) OF ILIAC VEIN OF LEFT LOWER EXTREMITY (HCC): ICD-10-CM

## 2020-07-28 DIAGNOSIS — Z00.00 MEDICARE ANNUAL WELLNESS VISIT, SUBSEQUENT: Primary | ICD-10-CM

## 2020-07-28 PROCEDURE — 4040F PNEUMOC VAC/ADMIN/RCVD: CPT | Performed by: INTERNAL MEDICINE

## 2020-07-28 PROCEDURE — 1123F ACP DISCUSS/DSCN MKR DOCD: CPT | Performed by: INTERNAL MEDICINE

## 2020-07-28 PROCEDURE — 1160F RVW MEDS BY RX/DR IN RCRD: CPT | Performed by: INTERNAL MEDICINE

## 2020-07-28 PROCEDURE — 3008F BODY MASS INDEX DOCD: CPT | Performed by: INTERNAL MEDICINE

## 2020-07-28 PROCEDURE — 3077F SYST BP >= 140 MM HG: CPT | Performed by: INTERNAL MEDICINE

## 2020-07-28 PROCEDURE — 1125F AMNT PAIN NOTED PAIN PRSNT: CPT | Performed by: INTERNAL MEDICINE

## 2020-07-28 PROCEDURE — G0402 INITIAL PREVENTIVE EXAM: HCPCS | Performed by: INTERNAL MEDICINE

## 2020-07-28 PROCEDURE — 1170F FXNL STATUS ASSESSED: CPT | Performed by: INTERNAL MEDICINE

## 2020-07-28 PROCEDURE — 99214 OFFICE O/P EST MOD 30 MIN: CPT | Performed by: INTERNAL MEDICINE

## 2020-07-28 PROCEDURE — 1036F TOBACCO NON-USER: CPT | Performed by: INTERNAL MEDICINE

## 2020-07-28 PROCEDURE — 3079F DIAST BP 80-89 MM HG: CPT | Performed by: INTERNAL MEDICINE

## 2020-07-28 RX ORDER — MOMETASONE FUROATE 1 MG/G
CREAM TOPICAL DAILY
Qty: 45 G | Refills: 1 | Status: SHIPPED | OUTPATIENT
Start: 2020-07-28 | End: 2020-12-29 | Stop reason: SDUPTHER

## 2020-07-28 NOTE — PROGRESS NOTES
Assessment and Plan:     1  Medicare wellness visit    The patient is doing generally well  He does not smoke, drink to excess, or use illicit drugs  He exercises regularly  He is watching his diet  His weight is satisfactory  He has not fallen  He has no symptoms of active psychiatric or cognitive dysfunction  He is able to complete his activities of daily living without difficulty  His home environment seems safe  The patient is up-to-date with his health screening studies  He is up-to-date with influenza, pneumococcal, and herpes zoster vaccination  I reviewed the recommendations for tetanus booster  I discussed advance care planning with the patient  He does have a living will  His wife will be his durable power of   I reviewed advanced directives with him  Preventive health issues were discussed with patient, and age appropriate screening tests were ordered as noted in patient's After Visit Summary  Personalized health advice and appropriate referrals for health education or preventive services given if needed, as noted in patient's After Visit Summary       History of Present Illness:     Patient presents for Medicare Annual Wellness visit    Patient Care Team:  Chivo Vieira MD as PCP - General  MD Dominick Holland MD     Problem List:     Patient Active Problem List   Diagnosis    Deep vein thrombosis (DVT) of iliac vein of left lower extremity ([address] )    Hypercholesterolemia    Anxiety    Osteoarthritis    Hypertension    Rash      Past Medical and Surgical History:     Past Medical History:   Diagnosis Date    Anxiety     Arthritis     Hypertension     Inguinal hernia, right     Unilateral inguinal hernia, with obstruction, without gangrene, not specified as recurrent      Past Surgical History:   Procedure Laterality Date    COLONOSCOPY      HERNIA REPAIR      HIP ARTHROPLASTY Right     2011    JOINT REPLACEMENT Right     hip    ID REPAIR ING HERNIA,5+Y/O,REDUCIBL Right 9/28/2016    Procedure: REPAIR HERNIA INGUINALRIGHT  WITH MESH INSERTION;  Surgeon: Marissa Hyman MD;  Location: [facility];  Service: General    SKIN BIOPSY Left     excision under left chest/rib as a child, noncancerous    VENOUS THROMBECTOMY Left     PERCUT TRANSLUM MECH; REPEAT ON SUBSEQ DAY; LEFT LOWER EXTREMITY DVT; SUCCESSFUL CLEARANCE OF ACUTE THROMBUS IN THE LEFT ILIOFEMORAL SEGMENT USING A COMBINATION OF MECHANICAL THROMBECTOMY AND STENT PLACEMENT; ONSET: [id number]    WISDOM TOOTH EXTRACTION        Family History:     Family History   Problem Relation Age of Onset    Heart failure Mother     Stroke Mother     Coronary artery disease Father     Stroke Father     Prostate cancer Brother     Stroke Brother       Social History:        Social History     Socioeconomic History    Marital status: /Civil Union     Spouse name: None    Number of children: None    Years of education: None    Highest education level: None   Occupational History    None   Social Needs    Financial resource strain: None    Food insecurity:     Worry: None     Inability: None    Transportation needs:     Medical: None     Non-medical: None   Tobacco Use    Smoking status: Never Smoker    Smokeless tobacco: Never Used   Substance and Sexual Activity    Alcohol use: Yes     Comment: 1x weekly 1-2 glasses of wine; SOCIAL    Drug use: No    Sexual activity: None   Lifestyle    Physical activity:     Days per week: None     Minutes per session: None    Stress: None   Relationships    Social connections:     Talks on phone: None     Gets together: None     Attends Zoroastrian service: None     Active member of club or organization: None     Attends meetings of clubs or organizations: None     Relationship status: None    Intimate partner violence:     Fear of current or ex partner: None     Emotionally abused: None     Physically abused: None     Forced sexual activity: None Other Topics Concern    None   Social History Narrative    Exercising regularly (more than 90 min/week)      Medications and Allergies:     Current Outpatient Medications   Medication Sig Dispense Refill    ALPRAZolam (XANAX) 0 25 mg tablet Take 1 tablet (0 25 mg total) by mouth 3 (three) times a day as needed for anxiety 90 tablet 1    cetirizine (ZYRTEC ALLERGY) 10 mg tablet Take 1 tablet by mouth daily      Cholecalciferol (VITAMIN D3) 2000 units capsule Take 1 capsule by mouth daily      escitalopram (LEXAPRO) 10 mg tablet Take 0 5 tablets (5 mg total) by mouth daily Pt needs oval pills, he can not cut in jordyn the round pills  7 tablet 0    Glucos-Chondroit-Hyaluron-MSM (GLUCOSAMINE CHONDROITIN JOINT PO) Take 1 tablet by mouth daily      Magnesium 250 MG TABS Take 1 tablet by mouth daily      mometasone (ELOCON) 0 1 % cream APPLY TOPICALLY ONCE DAILY 45 g 1    Multiple Vitamin (DAILY VALUE MULTIVITAMIN) TABS Take 1 tablet by mouth daily      Multiple Vitamins-Minerals (ICAPS AREDS 2 PO) Take 1 each by mouth daily      Saw [place] 450 MG CAPS Take 2 tablets by mouth daily      Turmeric 1053 MG TABS Take 1 each by mouth daily      XARELTO 20 MG tablet TAKE 1 TABLET BY MOUTH EVERY DAY 90 tablet 3     No current facility-administered medications for this visit        Allergies   Allergen Reactions    Atorvastatin      myalgias      Immunizations:     Immunization History   Administered Date(s) Administered    Influenza Quadrivalent, 6-35 Months IM 10/05/2015, 12/01/2016    Influenza Split High Dose Preservative Free IM 10/10/2018, 10/26/2019    Influenza, high dose seasonal 0 5 mL 10/10/2018    Pneumococcal Conjugate 13-Valent 04/05/2017    Pneumococcal Polysaccharide PPV23 07/31/2018    Zoster Vaccine Recombinant 02/05/2019, 04/03/2019      Health Maintenance:         Topic Date Due    CRC Screening: Colonoscopy  07/30/2029    Hepatitis C Screening  Completed         Topic Date Due    DTaP,Tdap,and Td Vaccines (1 - Tdap) 03[birth year]06/1963    Influenza Vaccine  07/01/2020      Medicare Health Risk Assessment:     BP (!) 181/66 (BP Location: Left arm, Patient Position: Sitting, Cuff Size: Standard)   Pulse 62   Temp 98 °F (36 7 °C) (Tympanic)   Ht 5' 9" (1 753 m)   Wt 85 9 kg (189 lb 6 4 oz)   SpO2 97%   BMI 27 97 kg/m²      Pam Stanford is here for his Subsequent Wellness visit  Health Risk Assessment:   Patient rates overall health as excellent  Patient feels that their physical health rating is same  Eyesight was rated as same  Hearing was rated as same  Patient feels that their emotional and mental health rating is same  Pain experienced in the last 7 days has been none  Patient states that he has experienced weight loss or gain in last 6 months  Fall Risk Screening: In the past year, patient has experienced: no history of falling in past year      Home Safety:  Patient does not have trouble with stairs inside or outside of their home  Patient has working smoke alarms and has working carbon monoxide detector  Nutrition:   Current diet is Regular  Medications:   Patient is currently taking over-the-counter supplements  OTC medications include: see medication list  Patient is able to manage medications  Activities of Daily Living (ADLs)/Instrumental Activities of Daily Living (IADLs):   Walk and transfer into and out of bed and chair?: Yes  Dress and groom yourself?: Yes    Bathe or shower yourself?: Yes    Feed yourself? Yes  Do your laundry/housekeeping?: Yes  Manage your money, pay your bills and track your expenses?: Yes  Make your own meals?: Yes    Do your own shopping?: Yes    Previous Hospitalizations:   Any hospitalizations or ED visits within the last 12 months?: No      Advance Care Planning:   Living will: Yes    Durable POA for healthcare:  Yes    Advanced directive: Yes    Advanced directive counseling given: Yes    End of Life Decisions reviewed with patient: Yes Cognitive Screening:   Provider or family/friend/caregiver concerned regarding cognition?: No    PREVENTIVE SCREENINGS      Cardiovascular Screening:    General: Screening Not Indicated and History Lipid Disorder      Diabetes Screening:     General: Screening Current      Colorectal Cancer Screening:     General: Screening Current      Prostate Cancer Screening:    General: Screening Current      Osteoporosis Screening:    General: Screening Not Indicated      Abdominal Aortic Aneurysm (AAA) Screening:    Risk factors include: age between 73-67 yo        Lung Cancer Screening:     General: Screening Not Indicated      Hepatitis C Screening:    General: Screening Current      Niko Amaral MD

## 2020-07-29 DIAGNOSIS — I82.522 CHRONIC DEEP VEIN THROMBOSIS (DVT) OF ILIAC VEIN OF LEFT LOWER EXTREMITY (HCC): ICD-10-CM

## 2020-07-29 RX ORDER — RIVAROXABAN 20 MG/1
TABLET, FILM COATED ORAL
Qty: 90 TABLET | Refills: 0 | Status: SHIPPED | OUTPATIENT
Start: 2020-07-29 | End: 2020-08-12

## 2020-07-29 NOTE — PROGRESS NOTES
[facility] [place]      NAME: Gilford Hane  AGE: 78 y o  SEX: male  : 1950   MRN: [id number]    DATE: 2020  TIME: 5:22 PM    Assessment and Plan   1  Essential hypertension  Well controlled  Continue current medications  2  Hypercholesterolemia  Currently on diet therapy alone  3  Chronic deep vein thrombosis (DVT) of iliac vein of left lower extremity (HCC)  Continue Xarelto  4  Rash  - mometasone (ELOCON) 0 1 % cream; Apply topically daily  Dispense: 45 g; Refill: 1    5  Medicare annual wellness visit, subsequent  See separate note  The patient is doing generally well  His blood pressure and lipids are satisfactory  He will continue on anticoagulation indefinitely  Return to office in:  6 months    Chief Complaint     Chief Complaint   Patient presents with    Medicare Wellness Visit    Immunizations     due for tdap        History of Present Illness     The patient returned to the office for re-evaluation of hypertension, hypercholesterolemia, previous extensive DVT, and anxiety  Since his last visit he has been feeling rather well  He denies chest pain, shortness of breath, palpitations, or dizziness  He has no problems with leg swelling  He does wear a compression stocking on his right leg  He is tolerating his medications well  The following portions of the patient's history were reviewed and updated as appropriate: allergies, current medications, past family history, past medical history, past social history, past surgical history and problem list     Review of Systems   Review of Systems   Constitutional: Negative  HENT: Negative for congestion, ear pain, postnasal drip, rhinorrhea, sore throat and trouble swallowing  Eyes: Negative for pain, discharge, redness and visual disturbance  Respiratory: Negative for cough, shortness of breath and wheezing  Cardiovascular: Negative  Gastrointestinal: Negative  Endocrine: Negative  Genitourinary: Negative for difficulty urinating, dysuria, frequency, hematuria and urgency  Musculoskeletal: Negative for arthralgias, gait problem, joint swelling and myalgias  Skin: Negative for rash  Neurological: Negative for dizziness, speech difficulty, weakness, light-headedness, numbness and headaches  Hematological: Negative  Psychiatric/Behavioral: Negative for confusion, decreased concentration, dysphoric mood and sleep disturbance  The patient is not nervous/anxious  Active Problem List     Patient Active Problem List   Diagnosis    Deep vein thrombosis (DVT) of iliac vein of left lower extremity (HCC)    Hypercholesterolemia    Anxiety    Osteoarthritis    Hypertension    Rash       Objective   /80 (BP Location: Right arm, Patient Position: Sitting, Cuff Size: Standard)   Pulse 62   Temp 98 °F (36 7 °C) (Tympanic)   Ht 5' 9" (1 753 m)   Wt 85 9 kg (189 lb 6 4 oz)   SpO2 97%   BMI 27 97 kg/m²     Physical Exam   Constitutional: He is oriented to person, place, and time  He appears well-developed and well-nourished  No distress  HENT:   Head: Normocephalic and atraumatic  Neck: Neck supple  No JVD present  No tracheal deviation present  No thyromegaly present  Cardiovascular: Normal rate, regular rhythm, normal heart sounds and intact distal pulses  Exam reveals no gallop and no friction rub  No murmur heard  Pulmonary/Chest: Effort normal and breath sounds normal  He has no wheezes  He has no rales  He exhibits no tenderness  Abdominal: Soft  Bowel sounds are normal  He exhibits no distension and no mass  There is no tenderness  There is no rebound  Musculoskeletal: Normal range of motion  He exhibits no edema or tenderness  Lymphadenopathy:     He has no cervical adenopathy  Neurological: He is alert and oriented to person, place, and time  Skin: Skin is warm and dry  Psychiatric: He has a normal mood and affect   His behavior is normal  Judgment and thought content normal        Pertinent Laboratory/Diagnostic Studies:  Orders Only on 07/14/2020   Component Date Value Ref Range Status    White Blood Cell Count 07/14/2020 5 0  3 4 - 10 8 x10E3/uL Final    Red Blood Cell Count 07/14/2020 4 95  4 14 - 5 80 x10E6/uL Final    Hemoglobin 07/14/2020 15 7  13 0 - 17 7 g/dL Final    HCT 07/14/2020 46 6  37 5 - 51 0 % Final    MCV 07/14/2020 94  79 - 97 fL Final    MCH 07/14/2020 31 7  26 6 - 33 0 pg Final    MCHC 07/14/2020 33 7  31 5 - 35 7 g/dL Final    RDW 07/14/2020 13 2  11 6 - 15 4 % Final    Platelet Count 33/92/0431 201  150 - 450 x10E3/uL Final    Neutrophils 07/14/2020 44  Not Estab  % Final    Lymphocytes 07/14/2020 41  Not Estab  % Final    Monocytes 07/14/2020 9  Not Estab  % Final    Eosinophils 07/14/2020 6  Not Estab  % Final    Basophils PCT 07/14/2020 0  Not Estab  % Final    Neutrophils (Absolute) 07/14/2020 2 2  1 4 - 7 0 x10E3/uL Final    Lymphocytes (Absolute) 07/14/2020 2 1  0 7 - 3 1 x10E3/uL Final    Monocytes (Absolute) 07/14/2020 0 5  0 1 - 0 9 x10E3/uL Final    Eosinophils (Absolute) 07/14/2020 0 3  0 0 - 0 4 x10E3/uL Final    Basophils ABS 07/14/2020 0 0  0 0 - 0 2 x10E3/uL Final    Immature Granulocytes 07/14/2020 0  Not Estab  % Final    Immature Granulocytes (Absolute) 07/14/2020 0 0  0 0 - 0 1 x10E3/uL Final    Glucose, Random 07/14/2020 106* 65 - 99 mg/dL Final    BUN 07/14/2020 13  8 - 27 mg/dL Final    Creatinine 07/14/2020 0 99  0 76 - 1 27 mg/dL Final    eGFR Non African American 07/14/2020 78  >59 mL/min/1 73 Final    eGFR African American 07/14/2020 90  >59 mL/min/1 73 Final    SL AMB BUN/CREATININE RATIO 07/14/2020 13  10 - 24 Final    Sodium 07/14/2020 139  134 - 144 mmol/L Final    Potassium 07/14/2020 4 9  3 5 - 5 2 mmol/L Final    Chloride 07/14/2020 101  96 - 106 mmol/L Final    CO2 07/14/2020 23  20 - 29 mmol/L Final    CALCIUM 07/14/2020 9 5  8 6 - 10 2 mg/dL Final    Protein, Total 07/14/2020 7 3  6 0 - 8 5 g/dL Final    Albumin 07/14/2020 4 5  3 8 - 4 8 g/dL Final    Globulin, Total 07/14/2020 2 8  1 5 - 4 5 g/dL Final    Albumin/Globulin Ratio 07/14/2020 1 6  1 2 - 2 2 Final    TOTAL BILIRUBIN 07/14/2020 0 6  0 0 - 1 2 mg/dL Final    Alk Phos Isoenzymes 07/14/2020 50  39 - 117 IU/L Final    AST 07/14/2020 24  0 - 40 IU/L Final    ALT 07/14/2020 29  0 - 44 IU/L Final    Cholesterol, Total 07/14/2020 234* 100 - 199 mg/dL Final    Triglycerides 07/14/2020 237* 0 - 149 mg/dL Final    HDL 07/14/2020 38* >39 mg/dL Final    VLDL Cholesterol Calculated 07/14/2020 47* 5 - 40 mg/dL Final    LDL Calculated 07/14/2020 149* 0 - 99 mg/dL Final    Prostate Specific Antigen Total 07/14/2020 2 9  0 0 - 4 0 ng/mL Final    HEP C AB 07/14/2020 <0 1  0 0 - 0 9 s/co ratio Final           Current Medications     Current Outpatient Medications:     ALPRAZolam (XANAX) 0 25 mg tablet, Take 1 tablet (0 25 mg total) by mouth 3 (three) times a day as needed for anxiety, Disp: 90 tablet, Rfl: 1    cetirizine (ZYRTEC ALLERGY) 10 mg tablet, Take 1 tablet by mouth daily, Disp: , Rfl:     Cholecalciferol (VITAMIN D3) 2000 units capsule, Take 1 capsule by mouth daily, Disp: , Rfl:     escitalopram (LEXAPRO) 10 mg tablet, Take 0 5 tablets (5 mg total) by mouth daily Pt needs oval pills, he can not cut in jordyn the round pills  , Disp: 7 tablet, Rfl: 0    Glucos-Chondroit-Hyaluron-MSM (GLUCOSAMINE CHONDROITIN JOINT PO), Take 1 tablet by mouth daily, Disp: , Rfl:     Magnesium 250 MG TABS, Take 1 tablet by mouth daily, Disp: , Rfl:     mometasone (ELOCON) 0 1 % cream, Apply topically daily, Disp: 45 g, Rfl: 1    Multiple Vitamin (DAILY VALUE MULTIVITAMIN) TABS, Take 1 tablet by mouth daily, Disp: , Rfl:     Multiple Vitamins-Minerals (ICAPS AREDS 2 PO), Take 1 each by mouth daily, Disp: , Rfl:     Saw [place] 450 MG CAPS, Take 2 tablets by mouth daily, Disp: , Rfl:     Turmeric 1053 MG TABS, Take 1 each by mouth daily, Disp: , Rfl:     XARELTO 20 MG tablet, TAKE 1 TABLET BY MOUTH EVERY DAY, Disp: 90 tablet, Rfl: 0      Nila Durán MD

## 2020-08-11 DIAGNOSIS — I82.522 CHRONIC DEEP VEIN THROMBOSIS (DVT) OF ILIAC VEIN OF LEFT LOWER EXTREMITY (HCC): ICD-10-CM

## 2020-08-12 RX ORDER — RIVAROXABAN 20 MG/1
TABLET, FILM COATED ORAL
Qty: 90 TABLET | Refills: 2 | Status: SHIPPED | OUTPATIENT
Start: 2020-08-12 | End: 2021-07-19 | Stop reason: SDUPTHER

## 2020-09-15 DIAGNOSIS — F41.9 ANXIETY: ICD-10-CM

## 2020-09-18 RX ORDER — ALPRAZOLAM 0.25 MG/1
0.25 TABLET ORAL 3 TIMES DAILY PRN
Qty: 90 TABLET | Refills: 1 | Status: SHIPPED | OUTPATIENT
Start: 2020-09-18 | End: 2021-02-05 | Stop reason: SDUPTHER

## 2020-12-29 DIAGNOSIS — R21 RASH: ICD-10-CM

## 2020-12-30 RX ORDER — MOMETASONE FUROATE 1 MG/G
CREAM TOPICAL DAILY
Qty: 45 G | Refills: 1 | Status: SHIPPED | OUTPATIENT
Start: 2020-12-30 | End: 2021-07-14 | Stop reason: SDUPTHER

## 2021-01-26 ENCOUNTER — TELEMEDICINE (OUTPATIENT)
Dept: INTERNAL MEDICINE CLINIC | Facility: CLINIC | Age: 71
End: 2021-01-26
Payer: MEDICARE

## 2021-01-26 DIAGNOSIS — I10 ESSENTIAL HYPERTENSION: ICD-10-CM

## 2021-01-26 DIAGNOSIS — I82.522 CHRONIC DEEP VEIN THROMBOSIS (DVT) OF ILIAC VEIN OF LEFT LOWER EXTREMITY (HCC): Primary | ICD-10-CM

## 2021-01-26 DIAGNOSIS — F41.9 ANXIETY: ICD-10-CM

## 2021-01-26 DIAGNOSIS — E78.00 HYPERCHOLESTEROLEMIA: ICD-10-CM

## 2021-01-26 PROCEDURE — 99213 OFFICE O/P EST LOW 20 MIN: CPT | Performed by: INTERNAL MEDICINE

## 2021-01-26 RX ORDER — ESCITALOPRAM OXALATE 10 MG/1
5 TABLET ORAL DAILY
Qty: 45 TABLET | Refills: 3 | Status: SHIPPED | OUTPATIENT
Start: 2021-01-26 | End: 2021-01-29

## 2021-01-27 NOTE — PROGRESS NOTES
Virtual Brief Visit    Assessment/Plan:    Problem List Items Addressed This Visit        Cardiovascular and Mediastinum    Deep vein thrombosis (DVT) of iliac vein of left lower extremity (HCC) - Primary    Hypertension       Other    Hypercholesterolemia    Anxiety    Relevant Medications    escitalopram (LEXAPRO) 10 mg tablet        The patient seems to be doing well  He is tolerating his medications for hypertension and hyperlipidemia  He has no symptoms to suggest recurrent DVT  He is tolerating rivaroxaban without complication  He will continue with his current regimen  If things go well, he will hopefully return for an in-person visit in 6 months  Reason for visit is   Chief Complaint   Patient presents with    Virtual Brief Visit        Encounter provider Laureen Nunez MD    Provider located at [facility] [id number]       After connecting through telephone, the patient was identified by name and date of birth  Cory Villarreal  was informed that this is a telemedicine visit and that the visit is being conducted through telephone  My office door was closed  No one else was in the room  He acknowledged consent and understanding of privacy and security of the platform  The patient has agreed to participate and understands he can discontinue the visit at any time  It was my intent to perform this visit via video technology but the patient was not able to do a video connection so the visit was completed via audio telephone only  Patient is aware this is a billable service  Subjective    Cory Villarreal  is a 78 y o  male who is re-evaluated for hypertension, hypercholesterolemia, and history of DVT  Since his last visit he has been feeling well  He has had no chest pain, shortness of breath, palpitations, or dizziness  He has had no pain in his legs nor has he had any swelling in his legs    He is tolerating his medications well  He has been monitoring his blood pressure at home and it is good       Past Medical History:   Diagnosis Date    Anxiety     Arthritis     Hypertension     Inguinal hernia, right     Unilateral inguinal hernia, with obstruction, without gangrene, not specified as recurrent        Past Surgical History:   Procedure Laterality Date    COLONOSCOPY      HERNIA REPAIR      HIP ARTHROPLASTY Right     2011    JOINT REPLACEMENT Right     hip    TX REPAIR ING HERNIA,5+Y/O,REDUCIBL Right 9/28/2016    Procedure: REPAIR HERNIA INGUINALRIGHT  WITH MESH INSERTION;  Surgeon: Twila Montoya MD;  [facility];  Service: General    SKIN BIOPSY Left     excision under left chest/rib as a child, noncancerous    VENOUS THROMBECTOMY Left     PERCUT TRANSLUM MEC; REPEAT ON SUBSEQ DAY; LEFT LOWER EXTREMITY DVT; SUCCESSFUL CLEARANCE OF ACUTE THROMBUS IN THE LEFT ILIOFEMORAL SEGMENT USING A COMBINATION OF MECHANICAL THROMBECTOMY AND STENT PLACEMENT; ONSET: [id number]    WISDOM TOOTH EXTRACTION         Current Outpatient Medications   Medication Sig Dispense Refill    ALPRAZolam (XANAX) 0 25 mg tablet Take 1 tablet (0 25 mg total) by mouth 3 (three) times a day as needed for anxiety 90 tablet 1    cetirizine (ZYRTEC ALLERGY) 10 mg tablet Take 1 tablet by mouth daily      Cholecalciferol (VITAMIN D3) 2000 units capsule Take 1 capsule by mouth daily      escitalopram (LEXAPRO) 10 mg tablet Take 0 5 tablets (5 mg total) by mouth daily Pt needs oval pills, he can not cut in jordyn the round pills   45 tablet 3    Glucos-Chondroit-Hyaluron-MSM (GLUCOSAMINE CHONDROITIN JOINT PO) Take 1 tablet by mouth daily      Magnesium 250 MG TABS Take 1 tablet by mouth daily      mometasone (ELOCON) 0 1 % cream Apply topically daily 45 g 1    Multiple Vitamin (DAILY VALUE MULTIVITAMIN) TABS Take 1 tablet by mouth daily      Multiple Vitamins-Minerals (ICAPS AREDS 2 PO) Take 1 each by mouth daily      Saw [place] 450 MG CAPS Take 2 tablets by mouth daily      Turmeric 1053 MG TABS Take 1 each by mouth daily      Xarelto 20 MG tablet TAKE 1 TABLET BY MOUTH EVERY DAY 90 tablet 2     No current facility-administered medications for this visit  Allergies   Allergen Reactions    Atorvastatin      myalgias       Review of Systems   Constitutional: Negative  HENT: Negative for congestion, ear pain, postnasal drip, rhinorrhea, sore throat and trouble swallowing  Eyes: Negative for pain, discharge, redness and visual disturbance  Respiratory: Negative for cough, shortness of breath and wheezing  Cardiovascular: Negative  Gastrointestinal: Negative  Endocrine: Negative  Genitourinary: Negative for difficulty urinating, dysuria, frequency, hematuria and urgency  Musculoskeletal: Negative for arthralgias, gait problem, joint swelling and myalgias  Skin: Negative for rash  Neurological: Negative for dizziness, speech difficulty, weakness, light-headedness, numbness and headaches  Hematological: Negative  Psychiatric/Behavioral: Negative for confusion, decreased concentration, dysphoric mood and sleep disturbance  The patient is not nervous/anxious  Weight:  148 lb  Temperature 98 3°  Blood pressure 122/70 a      I spent Fifteen minutes directly with the patient during this visit    [facility]  acknowledges that he has consented to an online visit or consultation  He understands that the online visit is based solely on information provided by him, and that, in the absence of a face-to-face physical evaluation by the physician, the diagnosis he receives is both limited and provisional in terms of accuracy and completeness  This is not intended to replace a full medical face-to-face evaluation by the physician  Matilde Heredia  understands and accepts these terms

## 2021-01-29 DIAGNOSIS — F41.9 ANXIETY: Primary | ICD-10-CM

## 2021-01-31 RX ORDER — ESCITALOPRAM OXALATE 5 MG/1
5 TABLET ORAL DAILY
Qty: 90 TABLET | Refills: 1 | Status: SHIPPED | OUTPATIENT
Start: 2021-01-31

## 2021-02-05 DIAGNOSIS — F41.9 ANXIETY: ICD-10-CM

## 2021-02-05 RX ORDER — ALPRAZOLAM 0.25 MG/1
0.25 TABLET ORAL 3 TIMES DAILY PRN
Qty: 90 TABLET | Refills: 1 | Status: SHIPPED | OUTPATIENT
Start: 2021-02-05 | End: 2021-07-14 | Stop reason: SDUPTHER

## 2021-03-30 DIAGNOSIS — Z23 ENCOUNTER FOR IMMUNIZATION: ICD-10-CM

## 2021-07-14 ENCOUNTER — OFFICE VISIT (OUTPATIENT)
Dept: INTERNAL MEDICINE CLINIC | Facility: CLINIC | Age: 71
End: 2021-07-14
Payer: MEDICARE

## 2021-07-14 VITALS
TEMPERATURE: 97.6 F | HEIGHT: 69 IN | WEIGHT: 198 LBS | HEART RATE: 61 BPM | DIASTOLIC BLOOD PRESSURE: 90 MMHG | BODY MASS INDEX: 29.33 KG/M2 | RESPIRATION RATE: 12 BRPM | SYSTOLIC BLOOD PRESSURE: 150 MMHG

## 2021-07-14 DIAGNOSIS — I82.522 CHRONIC DEEP VEIN THROMBOSIS (DVT) OF ILIAC VEIN OF LEFT LOWER EXTREMITY (HCC): ICD-10-CM

## 2021-07-14 DIAGNOSIS — F41.9 ANXIETY: ICD-10-CM

## 2021-07-14 DIAGNOSIS — R21 RASH: ICD-10-CM

## 2021-07-14 DIAGNOSIS — Z12.5 SCREENING FOR PROSTATE CANCER: ICD-10-CM

## 2021-07-14 DIAGNOSIS — E78.00 HYPERCHOLESTEROLEMIA: ICD-10-CM

## 2021-07-14 DIAGNOSIS — I10 ESSENTIAL HYPERTENSION: Primary | ICD-10-CM

## 2021-07-14 PROCEDURE — 99214 OFFICE O/P EST MOD 30 MIN: CPT | Performed by: INTERNAL MEDICINE

## 2021-07-14 RX ORDER — ALPRAZOLAM 0.25 MG/1
0.25 TABLET ORAL 3 TIMES DAILY PRN
Qty: 90 TABLET | Refills: 1 | Status: SHIPPED | OUTPATIENT
Start: 2021-07-14 | End: 2022-03-18 | Stop reason: SDUPTHER

## 2021-07-14 RX ORDER — MOMETASONE FUROATE 1 MG/G
CREAM TOPICAL DAILY
Qty: 45 G | Refills: 1 | Status: SHIPPED | OUTPATIENT
Start: 2021-07-14 | End: 2022-01-20 | Stop reason: SDUPTHER

## 2021-07-19 DIAGNOSIS — I82.522 CHRONIC DEEP VEIN THROMBOSIS (DVT) OF ILIAC VEIN OF LEFT LOWER EXTREMITY (HCC): ICD-10-CM

## 2021-07-23 NOTE — PROGRESS NOTES
[facility] [place]      NAME: Saintclair Spearman  AGE: 73 y o  SEX: male  : 1950   MRN: [id number]    DATE: 2021  TIME: 5:29 PM    Assessment and Plan   1  Essential hypertension  Well controlled  - CBC  - Comprehensive metabolic panel    2  Hypercholesterolemia  Continue diet and exercise  Check lipid profile  - Lipid panel    3  Chronic deep vein thrombosis (DVT) of iliac vein of left lower extremity (HCC)  Anticoagulated with rivaroxaban  4  Screening for prostate cancer  - PSA, Total Screen; Future    5  Rash  - mometasone (ELOCON) 0 1 % cream; Apply topically daily  Dispense: 45 g; Refill: 1    6  Anxiety  The patient has anxiety is under adequate control with low-dose alprazolam   - ALPRAZolam (XANAX) 0 25 mg tablet; Take 1 tablet (0 25 mg total) by mouth 3 (three) times a day as needed for anxiety  Dispense: 90 tablet; Refill: 1    The patient is doing well overall  He is tolerating his medications  He has had his COVID vaccination  He will continue his current regimen  Return to office in:  6 months for wellness visit    Chief Complaint     Chief Complaint   Patient presents with    Follow-up     6 month       History of Present Illness     The patient returned to the office for re-evaluation of hypertension, hypercholesterolemia, previous deep vein thrombosis, and anxiety  Since his last visit he has been feeling well  He has had no chest pain, shortness of breath, palpitations, or dizziness  He has minimal leg swelling  He is tolerating his medications well  The following portions of the patient's history were reviewed and updated as appropriate: allergies, current medications, past family history, past medical history, past social history, past surgical history and problem list     Review of Systems   Review of Systems   Constitutional: Negative  HENT: Negative for congestion, ear pain, postnasal drip, rhinorrhea, sore throat and trouble swallowing  Eyes: Negative for pain, discharge, redness and visual disturbance  Respiratory: Negative for cough, shortness of breath and wheezing  Cardiovascular: Negative  Gastrointestinal: Negative  Endocrine: Negative  Genitourinary: Negative for difficulty urinating, dysuria, frequency, hematuria and urgency  Musculoskeletal: Negative for arthralgias, gait problem, joint swelling and myalgias  Skin: Negative for rash  Neurological: Negative for dizziness, speech difficulty, weakness, light-headedness, numbness and headaches  Hematological: Negative  Psychiatric/Behavioral: Negative for confusion, decreased concentration, dysphoric mood and sleep disturbance  The patient is not nervous/anxious  Active Problem List     Patient Active Problem List   Diagnosis    Deep vein thrombosis (DVT) of iliac vein of left lower extremity (HCC)    Hypercholesterolemia    Anxiety    Osteoarthritis    Hypertension    Rash       Objective   /90 (BP Location: Left arm, Patient Position: Sitting, Cuff Size: Standard)   Pulse 61   Temp 97 6 °F (36 4 °C)   Resp 12   Ht 5' 9" (1 753 m)   Wt 89 8 kg (198 lb)   BMI 29 24 kg/m²     Physical Exam  Constitutional:       General: He is not in acute distress  Appearance: He is well-developed  HENT:      Head: Normocephalic and atraumatic  Neck:      Thyroid: No thyromegaly  Vascular: No JVD  Trachea: No tracheal deviation  Cardiovascular:      Rate and Rhythm: Normal rate and regular rhythm  Heart sounds: Normal heart sounds  No murmur heard  No friction rub  No gallop  Pulmonary:      Effort: Pulmonary effort is normal       Breath sounds: Normal breath sounds  No wheezing or rales  Chest:      Chest wall: No tenderness  Abdominal:      General: Bowel sounds are normal  There is no distension  Palpations: Abdomen is soft  There is no mass  Tenderness: There is no abdominal tenderness  There is no rebound  Musculoskeletal:         General: No tenderness  Normal range of motion  Cervical back: Neck supple  Lymphadenopathy:      Cervical: No cervical adenopathy  Skin:     General: Skin is warm and dry  Neurological:      Mental Status: He is alert and oriented to person, place, and time  Psychiatric:         Mood and Affect: Mood normal          Behavior: Behavior normal          Thought Content: Thought content normal          Judgment: Judgment normal          Pertinent Laboratory/Diagnostic Studies:  No visits with results within 3 Month(s) from this visit     Latest known visit with results is:   Orders Only on 07/14/2020   Component Date Value Ref Range Status    White Blood Cell Count 07/14/2020 5 0  3 4 - 10 8 x10E3/uL Final    Red Blood Cell Count 07/14/2020 4 95  4 14 - 5 80 x10E6/uL Final    Hemoglobin 07/14/2020 15 7  13 0 - 17 7 g/dL Final    HCT 07/14/2020 46 6  37 5 - 51 0 % Final    MCV 07/14/2020 94  79 - 97 fL Final    MCH 07/14/2020 31 7  26 6 - 33 0 pg Final    MCHC 07/14/2020 33 7  31 5 - 35 7 g/dL Final    RDW 07/14/2020 13 2  11 6 - 15 4 % Final    Platelet Count 57/82/3602 201  150 - 450 x10E3/uL Final    Neutrophils 07/14/2020 44  Not Estab  % Final    Lymphocytes 07/14/2020 41  Not Estab  % Final    Monocytes 07/14/2020 9  Not Estab  % Final    Eosinophils 07/14/2020 6  Not Estab  % Final    Basophils PCT 07/14/2020 0  Not Estab  % Final    Neutrophils (Absolute) 07/14/2020 2 2  1 4 - 7 0 x10E3/uL Final    Lymphocytes (Absolute) 07/14/2020 2 1  0 7 - 3 1 x10E3/uL Final    Monocytes (Absolute) 07/14/2020 0 5  0 1 - 0 9 x10E3/uL Final    Eosinophils (Absolute) 07/14/2020 0 3  0 0 - 0 4 x10E3/uL Final    Basophils ABS 07/14/2020 0 0  0 0 - 0 2 x10E3/uL Final    Immature Granulocytes 07/14/2020 0  Not Estab  % Final    Immature Granulocytes (Absolute) 07/14/2020 0 0  0 0 - 0 1 x10E3/uL Final    Glucose, Random 07/14/2020 106* 65 - 99 mg/dL Final    BUN 07/14/2020 13  8 - 27 mg/dL Final    Creatinine 07/14/2020 0 99  0 76 - 1 27 mg/dL Final    eGFR Non African American 07/14/2020 78  >59 mL/min/1 73 Final    eGFR African American 07/14/2020 90  >59 mL/min/1 73 Final    SL AMB BUN/CREATININE RATIO 07/14/2020 13  10 - 24 Final    Sodium 07/14/2020 139  134 - 144 mmol/L Final    Potassium 07/14/2020 4 9  3 5 - 5 2 mmol/L Final    Chloride 07/14/2020 101  96 - 106 mmol/L Final    CO2 07/14/2020 23  20 - 29 mmol/L Final    CALCIUM 07/14/2020 9 5  8 6 - 10 2 mg/dL Final    Protein, Total 07/14/2020 7 3  6 0 - 8 5 g/dL Final    Albumin 07/14/2020 4 5  3 8 - 4 8 g/dL Final    Globulin, Total 07/14/2020 2 8  1 5 - 4 5 g/dL Final    Albumin/Globulin Ratio 07/14/2020 1 6  1 2 - 2 2 Final    TOTAL BILIRUBIN 07/14/2020 0 6  0 0 - 1 2 mg/dL Final    Alk Phos Isoenzymes 07/14/2020 50  39 - 117 IU/L Final    AST 07/14/2020 24  0 - 40 IU/L Final    ALT 07/14/2020 29  0 - 44 IU/L Final    Cholesterol, Total 07/14/2020 234* 100 - 199 mg/dL Final    Triglycerides 07/14/2020 237* 0 - 149 mg/dL Final    HDL 07/14/2020 38* >39 mg/dL Final    VLDL Cholesterol Calculated 07/14/2020 47* 5 - 40 mg/dL Final    LDL Calculated 07/14/2020 149* 0 - 99 mg/dL Final    Prostate Specific Antigen Total 07/14/2020 2 9  0 0 - 4 0 ng/mL Final    HEP C AB 07/14/2020 <0 1  0 0 - 0 9 s/co ratio Final           Current Medications     Current Outpatient Medications:     ALPRAZolam (XANAX) 0 25 mg tablet, Take 1 tablet (0 25 mg total) by mouth 3 (three) times a day as needed for anxiety, Disp: 90 tablet, Rfl: 1    cetirizine (ZYRTEC ALLERGY) 10 mg tablet, Take 1 tablet by mouth daily, Disp: , Rfl:     Cholecalciferol (VITAMIN D3) 2000 units capsule, Take 1 capsule by mouth daily, Disp: , Rfl:     escitalopram (LEXAPRO) 5 mg tablet, Take 1 tablet (5 mg total) by mouth daily, Disp: 90 tablet, Rfl: 1    Glucos-Chondroit-Hyaluron-MSM (GLUCOSAMINE CHONDROITIN JOINT PO), Take 1 tablet by mouth daily, Disp: , Rfl:     Magnesium 250 MG TABS, Take 1 tablet by mouth daily, Disp: , Rfl:     mometasone (ELOCON) 0 1 % cream, Apply topically daily, Disp: 45 g, Rfl: 1    Multiple Vitamin (DAILY VALUE MULTIVITAMIN) TABS, Take 1 tablet by mouth daily, Disp: , Rfl:     Multiple Vitamins-Minerals (ICAPS AREDS 2 PO), Take 1 each by mouth daily, Disp: , Rfl:     Saw [place] 450 MG CAPS, Take 2 tablets by mouth daily, Disp: , Rfl:     Turmeric 1053 MG TABS, Take 1 each by mouth daily, Disp: , Rfl:     rivaroxaban (Xarelto) 20 mg tablet, Take 1 tablet (20 mg total) by mouth daily, Disp: 90 tablet, Rfl: 2      Annetta Zambrano MD

## 2021-08-02 DIAGNOSIS — I82.522 CHRONIC DEEP VEIN THROMBOSIS (DVT) OF ILIAC VEIN OF LEFT LOWER EXTREMITY (HCC): ICD-10-CM

## 2022-01-14 ENCOUNTER — TELEPHONE (OUTPATIENT)
Dept: INTERNAL MEDICINE CLINIC | Facility: CLINIC | Age: 72
End: 2022-01-14

## 2022-01-20 DIAGNOSIS — R21 RASH: ICD-10-CM

## 2022-01-20 RX ORDER — MOMETASONE FUROATE 1 MG/G
CREAM TOPICAL DAILY
Qty: 45 G | Refills: 1 | Status: SHIPPED | OUTPATIENT
Start: 2022-01-20

## 2022-02-15 LAB
ALBUMIN SERPL-MCNC: 4.6 G/DL (ref 3.8–4.8)
ALBUMIN/GLOB SERPL: 1.8 {RATIO} (ref 1.2–2.2)
ALP SERPL-CCNC: 54 IU/L (ref 44–121)
ALT SERPL-CCNC: 43 IU/L (ref 0–44)
AST SERPL-CCNC: 34 IU/L (ref 0–40)
BASOPHILS # BLD AUTO: 0 X10E3/UL (ref 0–0.2)
BASOPHILS NFR BLD AUTO: 1 %
BILIRUB SERPL-MCNC: 0.4 MG/DL (ref 0–1.2)
BUN SERPL-MCNC: 21 MG/DL (ref 8–27)
BUN/CREAT SERPL: 20 (ref 10–24)
CALCIUM SERPL-MCNC: 9.7 MG/DL (ref 8.6–10.2)
CHLORIDE SERPL-SCNC: 104 MMOL/L (ref 96–106)
CHOLEST SERPL-MCNC: 238 MG/DL (ref 100–199)
CO2 SERPL-SCNC: 24 MMOL/L (ref 20–29)
CREAT SERPL-MCNC: 1.07 MG/DL (ref 0.76–1.27)
EOSINOPHIL # BLD AUTO: 0.2 X10E3/UL (ref 0–0.4)
EOSINOPHIL NFR BLD AUTO: 4 %
ERYTHROCYTE [DISTWIDTH] IN BLOOD BY AUTOMATED COUNT: 12.9 % (ref 11.6–15.4)
GLOBULIN SER-MCNC: 2.6 G/DL (ref 1.5–4.5)
GLUCOSE SERPL-MCNC: 111 MG/DL (ref 65–99)
HCT VFR BLD AUTO: 44.4 % (ref 37.5–51)
HDLC SERPL-MCNC: 35 MG/DL
HGB BLD-MCNC: 15.5 G/DL (ref 13–17.7)
IMM GRANULOCYTES # BLD: 0 X10E3/UL (ref 0–0.1)
IMM GRANULOCYTES NFR BLD: 0 %
LDLC SERPL CALC-MCNC: 161 MG/DL (ref 0–99)
LYMPHOCYTES # BLD AUTO: 2.2 X10E3/UL (ref 0.7–3.1)
LYMPHOCYTES NFR BLD AUTO: 44 %
MCH RBC QN AUTO: 31.5 PG (ref 26.6–33)
MCHC RBC AUTO-ENTMCNC: 34.9 G/DL (ref 31.5–35.7)
MCV RBC AUTO: 90 FL (ref 79–97)
MONOCYTES # BLD AUTO: 0.6 X10E3/UL (ref 0.1–0.9)
MONOCYTES NFR BLD AUTO: 11 %
NEUTROPHILS # BLD AUTO: 2.1 X10E3/UL (ref 1.4–7)
NEUTROPHILS NFR BLD AUTO: 40 %
PLATELET # BLD AUTO: 196 X10E3/UL (ref 150–450)
POTASSIUM SERPL-SCNC: 4.3 MMOL/L (ref 3.5–5.2)
PROT SERPL-MCNC: 7.2 G/DL (ref 6–8.5)
PSA SERPL-MCNC: 0.9 NG/ML (ref 0–4)
RBC # BLD AUTO: 4.92 X10E6/UL (ref 4.14–5.8)
SL AMB EGFR AFRICAN AMERICAN: 81 ML/MIN/1.73
SL AMB EGFR NON AFRICAN AMERICAN: 70 ML/MIN/1.73
SL AMB VLDL CHOLESTEROL CALC: 42 MG/DL (ref 5–40)
SODIUM SERPL-SCNC: 140 MMOL/L (ref 134–144)
TRIGL SERPL-MCNC: 224 MG/DL (ref 0–149)
WBC # BLD AUTO: 5.1 X10E3/UL (ref 3.4–10.8)

## 2022-03-18 ENCOUNTER — OFFICE VISIT (OUTPATIENT)
Dept: INTERNAL MEDICINE CLINIC | Facility: CLINIC | Age: 72
End: 2022-03-18
Payer: MEDICARE

## 2022-03-18 VITALS
HEART RATE: 66 BPM | TEMPERATURE: 97.9 F | RESPIRATION RATE: 16 BRPM | HEIGHT: 69 IN | OXYGEN SATURATION: 97 % | DIASTOLIC BLOOD PRESSURE: 82 MMHG | SYSTOLIC BLOOD PRESSURE: 130 MMHG | BODY MASS INDEX: 30.57 KG/M2 | WEIGHT: 206.4 LBS

## 2022-03-18 DIAGNOSIS — I82.522 CHRONIC DEEP VEIN THROMBOSIS (DVT) OF ILIAC VEIN OF LEFT LOWER EXTREMITY (HCC): ICD-10-CM

## 2022-03-18 DIAGNOSIS — M15.9 PRIMARY OSTEOARTHRITIS INVOLVING MULTIPLE JOINTS: ICD-10-CM

## 2022-03-18 DIAGNOSIS — I10 PRIMARY HYPERTENSION: Primary | ICD-10-CM

## 2022-03-18 DIAGNOSIS — F41.9 ANXIETY: ICD-10-CM

## 2022-03-18 DIAGNOSIS — E78.00 HYPERCHOLESTEROLEMIA: ICD-10-CM

## 2022-03-18 PROCEDURE — G0439 PPPS, SUBSEQ VISIT: HCPCS | Performed by: INTERNAL MEDICINE

## 2022-03-18 PROCEDURE — 1123F ACP DISCUSS/DSCN MKR DOCD: CPT | Performed by: INTERNAL MEDICINE

## 2022-03-18 PROCEDURE — 99214 OFFICE O/P EST MOD 30 MIN: CPT | Performed by: INTERNAL MEDICINE

## 2022-03-18 RX ORDER — ROSUVASTATIN CALCIUM 10 MG/1
10 TABLET, COATED ORAL DAILY
Qty: 90 TABLET | Refills: 3 | Status: SHIPPED | OUTPATIENT
Start: 2022-03-18

## 2022-03-18 RX ORDER — ALPRAZOLAM 0.25 MG/1
0.25 TABLET ORAL 3 TIMES DAILY PRN
Qty: 90 TABLET | Refills: 1 | Status: SHIPPED | OUTPATIENT
Start: 2022-03-18

## 2022-03-18 NOTE — PROGRESS NOTES
Assessment and Plan:        1  Medicare wellness evaluation   The patient is doing well  He maintains a healthy lifestyle  He does not smoke, drink, or use illicit drugs  He exercises regularly  He watches his diet carefully  He has not fallen  He has no symptoms of depression or cognitive dysfunction  His home environment is safe  He has no difficulties with his activities of daily living  He is up-to-date with appropriate health screening studies  He is up-to-date with influenza, COVID, pneumococcal, and shingles vaccinations  He will consider a tetanus booster  I reviewed advance care planning with the patient  He does have a living will  His wife Mery Curran will be his medical spokesperson  We reviewed advanced directives  Preventive health issues were discussed with patient, and age appropriate screening tests were ordered as noted in patient's After Visit Summary  Personalized health advice and appropriate referrals for health education or preventive services given if needed, as noted in patient's After Visit Summary       History of Present Illness:     Patient presents for Medicare Annual Wellness visit    Patient Care Team:  Waqas Diaz MD as PCP - General  MD Kelly Jacobson MD     Problem List:     Patient Active Problem List   Diagnosis    Deep vein thrombosis (DVT) of iliac vein of left lower extremity ([facility] [address] )    Hypercholesterolemia    Anxiety    Osteoarthritis    Hypertension    Rash      Past Medical and Surgical History:     Past Medical History:   Diagnosis Date    Anxiety     Arthritis     Hypertension     Inguinal hernia, right     Unilateral inguinal hernia, with obstruction, without gangrene, not specified as recurrent      Past Surgical History:   Procedure Laterality Date    COLONOSCOPY  07/30/2019    10 YEAR RECOMMENDED= NORMAL COLON    HERNIA REPAIR      HIP ARTHROPLASTY Right     2011    JOINT REPLACEMENT Right     hip    IN REPAIR ING HERNIA,5+Y/O,REDUCIBL Right 9/28/2016    Procedure: REPAIR HERNIA INGUINALRIGHT  WITH MESH INSERTION;  Surgeon: Stone Beavers MD;  Location: [facility];  Service: General    SKIN BIOPSY Left     excision under left chest/rib as a child, noncancerous    VENOUS THROMBECTOMY Left     [facility]CUT [facility]; REPEAT ON SUBSEQ DAY; LEFT LOWER EXTREMITY DVT; SUCCESSFUL CLEARANCE OF ACUTE THROMBUS IN THE LEFT ILIOFEMORAL SEGMENT USING A COMBINATION OF MECHANICAL THROMBECTOMY AND STENT PLACEMENT; ONSET: [id number]    WISDOM TOOTH EXTRACTION        Family History:     Family History   Problem Relation Age of Onset    Heart failure Mother     Stroke Mother     Coronary artery disease Father     Stroke Father     Prostate cancer Brother     Stroke Brother       Social History:     Social History     Socioeconomic History    Marital status: /Civil Union     Spouse name: None    Number of children: None    Years of education: None    Highest education level: None   Occupational History    None   Tobacco Use    Smoking status: Never Smoker    Smokeless tobacco: Never Used   Vaping Use    Vaping Use: Never used   Substance and Sexual Activity    Alcohol use: Yes     Comment: 1x weekly 1-2 glasses of wine; SOCIAL    Drug use: No    Sexual activity: Not Currently   Other Topics Concern    None   Social History Narrative    Exercising regularly (more than 90 min/week)     Social Determinants of Health     Financial Resource Strain: Not on file   Food Insecurity: Not on file   Transportation Needs: Not on file   Physical Activity: Not on file   Stress: Not on file   Social Connections: Not on file   Intimate Partner Violence: Not on file   Housing Stability: Not on file      Medications and Allergies:     Current Outpatient Medications   Medication Sig Dispense Refill    ALPRAZolam (XANAX) 0 25 mg tablet Take 1 tablet (0 25 mg total) by mouth 3 (three) times a day as needed for anxiety 90 tablet 1    cetirizine (ZYRTEC ALLERGY) 10 mg tablet Take 1 tablet by mouth daily      Cholecalciferol (VITAMIN D3) 2000 units capsule Take 1 capsule by mouth daily      escitalopram (LEXAPRO) 5 mg tablet Take 1 tablet (5 mg total) by mouth daily 90 tablet 1    Glucos-Chondroit-Hyaluron-MSM (GLUCOSAMINE CHONDROITIN JOINT PO) Take 1 tablet by mouth daily      Magnesium 250 MG TABS Take 1 tablet by mouth daily      mometasone (ELOCON) 0 1 % cream Apply topically daily 45 g 1    Multiple Vitamin (DAILY VALUE MULTIVITAMIN) TABS Take 1 tablet by mouth daily      Multiple Vitamins-Minerals (ICAPS AREDS 2 PO) Take 1 each by mouth daily      rivaroxaban (Xarelto) 20 mg tablet Take 1 tablet (20 mg total) by mouth daily 90 tablet 2    Saw [place]met[place] 450 MG CAPS Take 2 tablets by mouth daily      Turmeric 1053 MG TABS Take 1 each by mouth daily       No current facility-administered medications for this visit       Allergies   Allergen Reactions    Atorvastatin      myalgias      Immunizations:     Immunization History   Administered Date(s) Administered    COVID-19 MODERNA VACC 0 5 ML IM 02/16/2021, 03/16/2021    Influenza Quadrivalent, 6-35 Months IM 10/05/2015, 12/01/2016    Influenza Split High Dose Preservative Free IM 10/10/2018, 10/26/2019    Influenza, high dose seasonal 0 7 mL 10/10/2018, 09/23/2020    Pneumococcal Conjugate 13-Valent 04/05/2017    Pneumococcal Polysaccharide PPV23 07/31/2018    Zoster Vaccine Recombinant 02/05/2019, 04/03/2019      Health Maintenance:         Topic Date Due    Colorectal Cancer Screening  07/30/2029    Hepatitis C Screening  Completed         Topic Date Due    DTaP,Tdap,and Td Vaccines (1 - Tdap) Never done    COVID-19 Vaccine (3 - Booster for Moderna series) 08/16/2021    Influenza Vaccine (1) 09/01/2021      Medicare Health Risk Assessment:     /82 (BP Location: Left arm, Patient Position: Sitting, Cuff Size: Standard)   Pulse 66   Temp 97 9 °F (36 6 °C) (Temporal) Resp 16   Ht 5' 9" (1 753 m)   Wt 93 6 kg (206 lb 6 4 oz)   SpO2 97%   BMI 30 48 kg/m²      Ryile Lauren is here for his Subsequent Wellness visit  Health Risk Assessment:   Patient rates overall health as excellent  Patient feels that their physical health rating is same  Patient is very satisfied with their life  Eyesight was rated as same  Hearing was rated as same  Patient feels that their emotional and mental health rating is same  Patients states they are never, rarely angry  Patient states they are never, rarely unusually tired/fatigued  Pain experienced in the last 7 days has been some  Patient's pain rating has been 4/10  Patient states that he has experienced no weight loss or gain in last 6 months  B/L Knee  Back    Depression Screening:   PHQ-2 Score: 0      Fall Risk Screening: In the past year, patient has experienced: no history of falling in past year      Home Safety:  Patient does not have trouble with stairs inside or outside of their home  Patient has working smoke alarms and has working carbon monoxide detector  Home safety hazards include: none  Nutrition:   Current diet is Regular and Limited junk food  Medications:   Patient is currently taking over-the-counter supplements  OTC medications include: see medication list  Patient is able to manage medications  Activities of Daily Living (ADLs)/Instrumental Activities of Daily Living (IADLs):   Walk and transfer into and out of bed and chair?: Yes  Dress and groom yourself?: Yes    Bathe or shower yourself?: Yes    Feed yourself? Yes  Do your laundry/housekeeping?: Yes  Manage your money, pay your bills and track your expenses?: Yes  Make your own meals?: Yes    Do your own shopping?: Yes    Previous Hospitalizations:   Any hospitalizations or ED visits within the last 12 months?: No      Advance Care Planning:   Living will: Yes    Durable POA for healthcare:  Yes    Advanced directive: Yes      Cognitive Screening:   Provider or family/friend/caregiver concerned regarding cognition?: No    PREVENTIVE SCREENINGS      Cardiovascular Screening:    General: Screening Not Indicated and History Lipid Disorder      Diabetes Screening:     General: Screening Current      Colorectal Cancer Screening:     General: Screening Current      Prostate Cancer Screening:    General: Screening Current      Osteoporosis Screening:    General: Screening Not Indicated      Abdominal Aortic Aneurysm (AAA) Screening:    Risk factors include: age between 73-67 yo        Lung Cancer Screening:     General: Screening Not Indicated      Hepatitis C Screening:    General: Screening Current    Screening, Brief Intervention, and Referral to Treatment (SBIRT)    Screening  Typical number of drinks in a day: 1  Typical number of drinks in a week: 8  Interpretation: Low risk drinking behavior      AUDIT-C Screenin) How often did you have a drink containing alcohol in the past year? never  2) How many drinks did you have on a typical day when you were drinking in the past year? 0  3) How often did you have 6 or more drinks on one occasion in the past year? never    AUDIT-C Score: 0  Interpretation: Score 0-3 (male): Negative screen for alcohol misuse    Single Item Drug Screening:  How often have you used an illegal drug (including marijuana) or a prescription medication for non-medical reasons in the past year? never    Single Item Drug Screen Score: 0  Interpretation: Negative screen for possible drug use disorder      Violet Ritter MD

## 2022-03-18 NOTE — PROGRESS NOTES
[facility] [place]      NAME: Melina Nicole  AGE: 81 y o  SEX: male  : 1950   MRN: [id number]    DATE: 3/18/2022  TIME: 3:16 PM    Assessment and Plan   1  Primary hypertension    Adequately controlled  2  Hypercholesterolemia    The patient's LDL was 161  I recommended pharmacologic intervention  He had myalgias with atorvastatin in the past   He was willing to try a low dose of rosuvastatin  If he is able to tolerate this he will repeat a lipid profile in 2 to 3 months  - rosuvastatin (CRESTOR) 10 MG tablet; Take 1 tablet (10 mg total) by mouth daily  Dispense: 90 tablet; Refill: 3  - Lipid panel  - Comprehensive metabolic panel    3  Chronic deep vein thrombosis (DVT) of iliac vein of left lower extremity (HCC)    The patient has some residual left leg swelling  He does wear compression stocking  He is tolerating rivaroxaban  4  Primary osteoarthritis involving multiple joints    Mildly symptomatic  This has not interfered with the patient's activities  5  Anxiety    Controlled with alprazolam   - ALPRAZolam (XANAX) 0 25 mg tablet; Take 1 tablet (0 25 mg total) by mouth 3 (three) times a day as needed for anxiety  Dispense: 90 tablet; Refill: 1      The patient is doing generally well  He will maintain his current medications for now with the addition of rosuvastatin as mention  He is hoping to be living in [place] before the time of his next visit  I told him we would be happy to forward his records to his new physician  Return to office in:   As needed    Chief Complaint     Chief Complaint   Patient presents with   [facility]     Subsequent       History of Present Illness      the patient returned to the office for re-evaluation of hypertension, hypercholesterolemia, past DVT, and anxiety  Since his last visit he has been feeling well  He has had no chest pain, shortness of breath, palpitations, or dizziness    He has mild left leg swelling which has been chronic since his DVT  He has been exercising most days  Some days he does 2-1/2 hours of cardio  He is having no problems with his medications  The following portions of the patient's history were reviewed and updated as appropriate: allergies, current medications, past family history, past medical history, past social history, past surgical history and problem list     Review of Systems   Review of Systems   Constitutional: Negative  HENT: Negative for congestion, ear pain, postnasal drip, rhinorrhea, sore throat and trouble swallowing  Eyes: Negative for pain, discharge, redness and visual disturbance  Respiratory: Negative for cough, shortness of breath and wheezing  Cardiovascular: Negative  Gastrointestinal: Negative  Endocrine: Negative  Genitourinary: Negative for difficulty urinating, dysuria, frequency, hematuria and urgency  Musculoskeletal: Negative for arthralgias, gait problem, joint swelling and myalgias  Skin: Negative for rash  Neurological: Negative for dizziness, speech difficulty, weakness, light-headedness, numbness and headaches  Hematological: Negative  Psychiatric/Behavioral: Negative for confusion, decreased concentration, dysphoric mood and sleep disturbance  The patient is not nervous/anxious  Active Problem List     Patient Active Problem List   Diagnosis    Deep vein thrombosis (DVT) of iliac vein of left lower extremity (HCC)    Hypercholesterolemia    Anxiety    Osteoarthritis    Hypertension    Rash       Objective   /82 (BP Location: Left arm, Patient Position: Sitting, Cuff Size: Standard)   Pulse 66   Temp 97 9 °F (36 6 °C) (Temporal)   Resp 16   Ht 5' 9" (1 753 m)   Wt 93 6 kg (206 lb 6 4 oz)   SpO2 97%   BMI 30 48 kg/m²     Physical Exam  Constitutional:       General: He is not in acute distress  Appearance: He is well-developed  HENT:      Head: Normocephalic and atraumatic     Neck: Thyroid: No thyromegaly  Vascular: No JVD  Trachea: No tracheal deviation  Cardiovascular:      Rate and Rhythm: Normal rate and regular rhythm  Heart sounds: Normal heart sounds  No murmur heard  No friction rub  No gallop  Pulmonary:      Effort: Pulmonary effort is normal       Breath sounds: Normal breath sounds  No wheezing or rales  Chest:      Chest wall: No tenderness  Abdominal:      General: Bowel sounds are normal  There is no distension  Palpations: Abdomen is soft  There is no mass  Tenderness: There is no abdominal tenderness  There is no rebound  Musculoskeletal:         General: No tenderness  Normal range of motion  Cervical back: Neck supple  Lymphadenopathy:      Cervical: No cervical adenopathy  Skin:     General: Skin is warm and dry  Neurological:      Mental Status: He is alert and oriented to person, place, and time  Psychiatric:         Mood and Affect: Mood normal          Behavior: Behavior normal          Thought Content:  Thought content normal          Judgment: Judgment normal          Pertinent Laboratory/Diagnostic Studies:  Orders Only on 02/15/2022   Component Date Value Ref Range Status    White Blood Cell Count 02/15/2022 5 1  3 4 - 10 8 x10E3/uL Final    Red Blood Cell Count 02/15/2022 4 92  4 14 - 5 80 x10E6/uL Final    Hemoglobin 02/15/2022 15 5  13 0 - 17 7 g/dL Final    HCT 02/15/2022 44 4  37 5 - 51 0 % Final    MCV 02/15/2022 90  79 - 97 fL Final    MCH 02/15/2022 31 5  26 6 - 33 0 pg Final    MCHC 02/15/2022 34 9  31 5 - 35 7 g/dL Final    RDW 02/15/2022 12 9  11 6 - 15 4 % Final    Platelet Count 83/20/0758 196  150 - 450 x10E3/uL Final    Neutrophils 02/15/2022 40  Not Estab  % Final    Lymphocytes 02/15/2022 44  Not Estab  % Final    Monocytes 02/15/2022 11  Not Estab  % Final    Eosinophils 02/15/2022 4  Not Estab  % Final    Basophils PCT 02/15/2022 1  Not Estab  % Final    Neutrophils (Absolute) 02/15/2022 2 1  1 4 - 7 0 x10E3/uL Final    Lymphocytes (Absolute) 02/15/2022 2 2  0 7 - 3 1 x10E3/uL Final    Monocytes (Absolute) 02/15/2022 0 6  0 1 - 0 9 x10E3/uL Final    Eosinophils (Absolute) 02/15/2022 0 2  0 0 - 0 4 x10E3/uL Final    Basophils ABS 02/15/2022 0 0  0 0 - 0 2 x10E3/uL Final    Immature Granulocytes 02/15/2022 0  Not Estab  % Final    Immature Granulocytes (Absolute) 02/15/2022 0 0  0 0 - 0 1 x10E3/uL Final    Glucose, Random 02/15/2022 111* 65 - 99 mg/dL Final    BUN 02/15/2022 21  8 - 27 mg/dL Final    Creatinine 02/15/2022 1 07  0 76 - 1 27 mg/dL Final    eGFR Non  02/15/2022 70  >59 mL/min/1 73 Final    eGFR  02/15/2022 81  >59 mL/min/1 73 Final    SL AMB BUN/CREATININE RATIO 02/15/2022 20  10 - 24 Final    Sodium 02/15/2022 140  134 - 144 mmol/L Final    Potassium 02/15/2022 4 3  3 5 - 5 2 mmol/L Final    Chloride 02/15/2022 104  96 - 106 mmol/L Final    CO2 02/15/2022 24  20 - 29 mmol/L Final    CALCIUM 02/15/2022 9 7  8 6 - 10 2 mg/dL Final    Protein, Total 02/15/2022 7 2  6 0 - 8 5 g/dL Final    Albumin 02/15/2022 4 6  3 8 - 4 8 g/dL Final    Globulin, Total 02/15/2022 2 6  1 5 - 4 5 g/dL Final    Albumin/Globulin Ratio 02/15/2022 1 8  1 2 - 2 2 Final    TOTAL BILIRUBIN 02/15/2022 0 4  0 0 - 1 2 mg/dL Final    Alk Phos Isoenzymes 02/15/2022 54  44 - 121 IU/L Final    AST 02/15/2022 34  0 - 40 IU/L Final    ALT 02/15/2022 43  0 - 44 IU/L Final    Cholesterol, Total 02/15/2022 238* 100 - 199 mg/dL Final    Triglycerides 02/15/2022 224* 0 - 149 mg/dL Final    HDL 02/15/2022 35* >39 mg/dL Final    VLDL Cholesterol Calculated 02/15/2022 42* 5 - 40 mg/dL Final    LDL Calculated 02/15/2022 161* 0 - 99 mg/dL Final    Prostate Specific Antigen Total 02/15/2022 0 9  0 0 - 4 0 ng/mL Final           Current Medications     Current Outpatient Medications:     ALPRAZolam (XANAX) 0 25 mg tablet, Take 1 tablet (0 25 mg total) by mouth 3 (three) times a day as needed for anxiety, Disp: 90 tablet, Rfl: 1    cetirizine (ZYRTEC ALLERGY) 10 mg tablet, Take 1 tablet by mouth daily, Disp: , Rfl:     Cholecalciferol (VITAMIN D3) 2000 units capsule, Take 1 capsule by mouth daily, Disp: , Rfl:     escitalopram (LEXAPRO) 5 mg tablet, Take 1 tablet (5 mg total) by mouth daily, Disp: 90 tablet, Rfl: 1    Glucos-Chondroit-Hyaluron-MSM (GLUCOSAMINE CHONDROITIN JOINT PO), Take 1 tablet by mouth daily, Disp: , Rfl:     Magnesium 250 MG TABS, Take 1 tablet by mouth daily, Disp: , Rfl:     mometasone (ELOCON) 0 1 % cream, Apply topically daily, Disp: 45 g, Rfl: 1    Multiple Vitamin (DAILY VALUE MULTIVITAMIN) TABS, Take 1 tablet by mouth daily, Disp: , Rfl:     Multiple Vitamins-Minerals (ICAPS AREDS 2 PO), Take 1 each by mouth daily, Disp: , Rfl:     rivaroxaban (Xarelto) 20 mg tablet, Take 1 tablet (20 mg total) by mouth daily, Disp: 90 tablet, Rfl: 2    Saw Palmetto 450 MG CAPS, Take 2 tablets by mouth daily, Disp: , Rfl:     Turmeric 1053 MG TABS, Take 1 each by mouth daily, Disp: , Rfl:     rosuvastatin (CRESTOR) 10 MG tablet, Take 1 tablet (10 mg total) by mouth daily, Disp: 90 tablet, Rfl: 3      Meli Bridges MD

## 2022-06-15 DIAGNOSIS — I82.522 CHRONIC DEEP VEIN THROMBOSIS (DVT) OF ILIAC VEIN OF LEFT LOWER EXTREMITY (HCC): ICD-10-CM

## 2022-08-17 ENCOUNTER — TELEPHONE (OUTPATIENT)
Dept: INTERNAL MEDICINE CLINIC | Facility: CLINIC | Age: 72
End: 2022-08-17

## 2022-08-17 DIAGNOSIS — F41.9 ANXIETY: ICD-10-CM

## 2022-08-17 RX ORDER — ESCITALOPRAM OXALATE 5 MG/1
5 TABLET ORAL DAILY
Qty: 30 TABLET | Refills: 0 | Status: SHIPPED | OUTPATIENT
Start: 2022-08-17

## 2022-08-17 NOTE — TELEPHONE ENCOUNTER
Patient called relocated to [place]   Requesting 30 day supply of medication to hold him over until he finds new PCP

## 2022-08-23 NOTE — TELEPHONE ENCOUNTER
08/23/22 8:38 AM        Thank you for your request  Your request has been received, reviewed, and the patient chart updated  The PCP has successfully been removed with a patient attribution note  This message will now be completed          Thank you  Jose Gillis